# Patient Record
Sex: MALE | Race: WHITE | Employment: UNEMPLOYED | URBAN - NONMETROPOLITAN AREA
[De-identification: names, ages, dates, MRNs, and addresses within clinical notes are randomized per-mention and may not be internally consistent; named-entity substitution may affect disease eponyms.]

---

## 2017-08-02 ENCOUNTER — HOSPITAL ENCOUNTER (INPATIENT)
Age: 42
LOS: 2 days | Discharge: HOME | DRG: 885 | End: 2017-08-04
Attending: EMERGENCY MEDICINE | Admitting: PSYCHIATRY & NEUROLOGY
Payer: MEDICAID

## 2017-08-02 ENCOUNTER — APPOINTMENT (OUTPATIENT)
Dept: CT IMAGING | Age: 42
DRG: 885 | End: 2017-08-02
Payer: MEDICAID

## 2017-08-02 DIAGNOSIS — S09.90XS CLOSED HEAD INJURY, SEQUELA: ICD-10-CM

## 2017-08-02 DIAGNOSIS — F39 MOOD DISORDER (HCC): ICD-10-CM

## 2017-08-02 DIAGNOSIS — R45.851 SUICIDAL IDEATION: Primary | ICD-10-CM

## 2017-08-02 DIAGNOSIS — S01.112S LACERATION OF LEFT EYEBROW, SEQUELA: ICD-10-CM

## 2017-08-02 PROBLEM — F33.2 SEVERE EPISODE OF RECURRENT MAJOR DEPRESSIVE DISORDER, WITHOUT PSYCHOTIC FEATURES (HCC): Status: ACTIVE | Noted: 2017-08-02

## 2017-08-02 LAB
ACETAMINOPHEN LEVEL: <15 UG/ML
ALBUMIN SERPL-MCNC: 4.4 G/DL (ref 3.5–5.2)
ALP BLD-CCNC: 83 U/L (ref 40–130)
ALT SERPL-CCNC: 13 U/L (ref 5–41)
AMPHETAMINE SCREEN, URINE: NEGATIVE
ANION GAP SERPL CALCULATED.3IONS-SCNC: 14 MMOL/L (ref 7–19)
AST SERPL-CCNC: 17 U/L (ref 5–40)
BARBITURATE SCREEN URINE: NEGATIVE
BASOPHILS ABSOLUTE: 0.1 K/UL (ref 0–0.2)
BASOPHILS RELATIVE PERCENT: 0.5 % (ref 0–1)
BENZODIAZEPINE SCREEN, URINE: NEGATIVE
BILIRUB SERPL-MCNC: <0.2 MG/DL (ref 0.2–1.2)
BUN BLDV-MCNC: 20 MG/DL (ref 6–20)
CALCIUM SERPL-MCNC: 10.3 MG/DL (ref 8.6–10)
CANNABINOID SCREEN URINE: NEGATIVE
CHLORIDE BLD-SCNC: 100 MMOL/L (ref 98–111)
CO2: 28 MMOL/L (ref 22–29)
COCAINE METABOLITE SCREEN URINE: NEGATIVE
CREAT SERPL-MCNC: 0.9 MG/DL (ref 0.5–1.2)
EOSINOPHILS ABSOLUTE: 0.1 K/UL (ref 0–0.6)
EOSINOPHILS RELATIVE PERCENT: 0.5 % (ref 0–5)
ETHANOL: <10 MG/DL (ref 0–0.08)
GFR NON-AFRICAN AMERICAN: >60
GLUCOSE BLD-MCNC: 93 MG/DL (ref 74–109)
HCT VFR BLD CALC: 41.6 % (ref 42–52)
HEMOGLOBIN: 13.5 G/DL (ref 14–18)
LYMPHOCYTES ABSOLUTE: 4 K/UL (ref 1.1–4.5)
LYMPHOCYTES RELATIVE PERCENT: 30.4 % (ref 20–40)
Lab: NORMAL
MCH RBC QN AUTO: 26.8 PG (ref 27–31)
MCHC RBC AUTO-ENTMCNC: 32.5 G/DL (ref 33–37)
MCV RBC AUTO: 82.5 FL (ref 80–94)
MONOCYTES ABSOLUTE: 0.8 K/UL (ref 0–0.9)
MONOCYTES RELATIVE PERCENT: 6.2 % (ref 0–10)
NEUTROPHILS ABSOLUTE: 8.3 K/UL (ref 1.5–7.5)
NEUTROPHILS RELATIVE PERCENT: 62.1 % (ref 50–65)
OPIATE SCREEN URINE: NEGATIVE
PDW BLD-RTO: 13.2 % (ref 11.5–14.5)
PLATELET # BLD: 328 K/UL (ref 130–400)
PMV BLD AUTO: 9.4 FL (ref 9.4–12.4)
POTASSIUM SERPL-SCNC: 3.9 MMOL/L (ref 3.5–5)
RBC # BLD: 5.04 M/UL (ref 4.7–6.1)
SALICYLATE, SERUM: <3 MG/DL (ref 3–10)
SODIUM BLD-SCNC: 142 MMOL/L (ref 136–145)
TOTAL PROTEIN: 7.7 G/DL (ref 6.6–8.7)
WBC # BLD: 13.3 K/UL (ref 4.8–10.8)

## 2017-08-02 PROCEDURE — 6370000000 HC RX 637 (ALT 250 FOR IP): Performed by: EMERGENCY MEDICINE

## 2017-08-02 PROCEDURE — 6370000000 HC RX 637 (ALT 250 FOR IP): Performed by: NURSE PRACTITIONER

## 2017-08-02 PROCEDURE — G0480 DRUG TEST DEF 1-7 CLASSES: HCPCS

## 2017-08-02 PROCEDURE — 80053 COMPREHEN METABOLIC PANEL: CPT

## 2017-08-02 PROCEDURE — 36415 COLL VENOUS BLD VENIPUNCTURE: CPT

## 2017-08-02 PROCEDURE — 99284 EMERGENCY DEPT VISIT MOD MDM: CPT | Performed by: EMERGENCY MEDICINE

## 2017-08-02 PROCEDURE — 80307 DRUG TEST PRSMV CHEM ANLYZR: CPT

## 2017-08-02 PROCEDURE — 1240000000 HC EMOTIONAL WELLNESS R&B

## 2017-08-02 PROCEDURE — 90792 PSYCH DIAG EVAL W/MED SRVCS: CPT | Performed by: NURSE PRACTITIONER

## 2017-08-02 PROCEDURE — 99285 EMERGENCY DEPT VISIT HI MDM: CPT

## 2017-08-02 PROCEDURE — 85025 COMPLETE CBC W/AUTO DIFF WBC: CPT

## 2017-08-02 PROCEDURE — 70450 CT HEAD/BRAIN W/O DYE: CPT

## 2017-08-02 RX ORDER — QUETIAPINE FUMARATE 100 MG/1
100 TABLET, FILM COATED ORAL 3 TIMES DAILY
Status: DISCONTINUED | OUTPATIENT
Start: 2017-08-02 | End: 2017-08-04 | Stop reason: HOSPADM

## 2017-08-02 RX ORDER — ACETAMINOPHEN 500 MG
1000 TABLET ORAL ONCE
Status: COMPLETED | OUTPATIENT
Start: 2017-08-02 | End: 2017-08-02

## 2017-08-02 RX ORDER — ACETAMINOPHEN 325 MG/1
650 TABLET ORAL EVERY 4 HOURS PRN
Status: DISCONTINUED | OUTPATIENT
Start: 2017-08-02 | End: 2017-08-04 | Stop reason: HOSPADM

## 2017-08-02 RX ORDER — ESCITALOPRAM OXALATE 10 MG/1
10 TABLET ORAL NIGHTLY
Status: DISCONTINUED | OUTPATIENT
Start: 2017-08-02 | End: 2017-08-04 | Stop reason: HOSPADM

## 2017-08-02 RX ORDER — QUETIAPINE FUMARATE 100 MG/1
100 TABLET, FILM COATED ORAL 3 TIMES DAILY
Status: ON HOLD | COMMUNITY
End: 2017-08-04 | Stop reason: HOSPADM

## 2017-08-02 RX ADMIN — QUETIAPINE FUMARATE 100 MG: 100 TABLET, FILM COATED ORAL at 09:19

## 2017-08-02 RX ADMIN — ESCITALOPRAM OXALATE 10 MG: 10 TABLET ORAL at 21:26

## 2017-08-02 RX ADMIN — QUETIAPINE FUMARATE 100 MG: 100 TABLET, FILM COATED ORAL at 03:23

## 2017-08-02 RX ADMIN — QUETIAPINE FUMARATE 100 MG: 100 TABLET, FILM COATED ORAL at 14:28

## 2017-08-02 RX ADMIN — QUETIAPINE FUMARATE 100 MG: 100 TABLET, FILM COATED ORAL at 21:26

## 2017-08-02 ASSESSMENT — SLEEP AND FATIGUE QUESTIONNAIRES
DO YOU USE A SLEEP AID: YES
DIFFICULTY FALLING ASLEEP: YES
AVERAGE NUMBER OF SLEEP HOURS: 0
RESTFUL SLEEP: NO
DO YOU HAVE DIFFICULTY SLEEPING: YES
DIFFICULTY ARISING: NO
SLEEP PATTERN: DIFFICULTY FALLING ASLEEP;INSOMNIA
DIFFICULTY STAYING ASLEEP: YES

## 2017-08-02 ASSESSMENT — ENCOUNTER SYMPTOMS
APNEA: 0
CONSTIPATION: 0
EYE DISCHARGE: 0
FACIAL SWELLING: 0
SINUS PRESSURE: 0
SORE THROAT: 0
NAUSEA: 0
BLOOD IN STOOL: 0
VOICE CHANGE: 0
CHOKING: 0
DIARRHEA: 0

## 2017-08-02 ASSESSMENT — PAIN SCALES - GENERAL
PAINLEVEL_OUTOF10: 5
PAINLEVEL_OUTOF10: 0

## 2017-08-02 ASSESSMENT — LIFESTYLE VARIABLES: HISTORY_ALCOHOL_USE: YES

## 2017-08-03 LAB
TSH SERPL DL<=0.05 MIU/L-ACNC: 0.75 UIU/ML (ref 0.27–4.2)
VITAMIN B-12: 303 PG/ML (ref 211–946)
VITAMIN D 25-HYDROXY: 20.8 NG/ML

## 2017-08-03 PROCEDURE — 6370000000 HC RX 637 (ALT 250 FOR IP): Performed by: NURSE PRACTITIONER

## 2017-08-03 PROCEDURE — 99231 SBSQ HOSP IP/OBS SF/LOW 25: CPT | Performed by: NURSE PRACTITIONER

## 2017-08-03 PROCEDURE — 6370000000 HC RX 637 (ALT 250 FOR IP): Performed by: EMERGENCY MEDICINE

## 2017-08-03 PROCEDURE — 82607 VITAMIN B-12: CPT

## 2017-08-03 PROCEDURE — 1240000000 HC EMOTIONAL WELLNESS R&B

## 2017-08-03 PROCEDURE — 6370000000 HC RX 637 (ALT 250 FOR IP): Performed by: FAMILY MEDICINE

## 2017-08-03 PROCEDURE — 82306 VITAMIN D 25 HYDROXY: CPT

## 2017-08-03 PROCEDURE — 36415 COLL VENOUS BLD VENIPUNCTURE: CPT

## 2017-08-03 PROCEDURE — 84443 ASSAY THYROID STIM HORMONE: CPT

## 2017-08-03 RX ORDER — ERGOCALCIFEROL 1.25 MG/1
50000 CAPSULE ORAL WEEKLY
Status: DISCONTINUED | OUTPATIENT
Start: 2017-08-03 | End: 2017-08-04 | Stop reason: HOSPADM

## 2017-08-03 RX ORDER — ERGOCALCIFEROL (VITAMIN D2) 1250 MCG
50000 CAPSULE ORAL WEEKLY
Qty: 11 CAPSULE | Refills: 0 | Status: SHIPPED | OUTPATIENT
Start: 2017-08-03 | End: 2017-08-04

## 2017-08-03 RX ORDER — CHOLECALCIFEROL (VITAMIN D3) 125 MCG
500 CAPSULE ORAL DAILY
Status: DISCONTINUED | OUTPATIENT
Start: 2017-08-04 | End: 2017-08-04 | Stop reason: HOSPADM

## 2017-08-03 RX ADMIN — ERGOCALCIFEROL 50000 UNITS: 1.25 CAPSULE ORAL at 21:53

## 2017-08-03 RX ADMIN — QUETIAPINE FUMARATE 100 MG: 100 TABLET, FILM COATED ORAL at 14:20

## 2017-08-03 RX ADMIN — QUETIAPINE FUMARATE 100 MG: 100 TABLET, FILM COATED ORAL at 08:55

## 2017-08-03 RX ADMIN — QUETIAPINE FUMARATE 100 MG: 100 TABLET, FILM COATED ORAL at 21:32

## 2017-08-03 RX ADMIN — ESCITALOPRAM OXALATE 10 MG: 10 TABLET ORAL at 21:32

## 2017-08-04 VITALS
BODY MASS INDEX: 18.43 KG/M2 | SYSTOLIC BLOOD PRESSURE: 101 MMHG | HEIGHT: 63 IN | TEMPERATURE: 98 F | RESPIRATION RATE: 20 BRPM | OXYGEN SATURATION: 98 % | DIASTOLIC BLOOD PRESSURE: 58 MMHG | HEART RATE: 66 BPM | WEIGHT: 104 LBS

## 2017-08-04 PROCEDURE — 6370000000 HC RX 637 (ALT 250 FOR IP): Performed by: FAMILY MEDICINE

## 2017-08-04 PROCEDURE — 6370000000 HC RX 637 (ALT 250 FOR IP): Performed by: EMERGENCY MEDICINE

## 2017-08-04 PROCEDURE — 99238 HOSP IP/OBS DSCHRG MGMT 30/<: CPT | Performed by: NURSE PRACTITIONER

## 2017-08-04 PROCEDURE — 5130000000 HC BRIDGE APPOINTMENT

## 2017-08-04 RX ORDER — QUETIAPINE FUMARATE 100 MG/1
100 TABLET, FILM COATED ORAL 3 TIMES DAILY
Qty: 90 TABLET | Refills: 0 | Status: ON HOLD | OUTPATIENT
Start: 2017-08-04 | End: 2019-06-14 | Stop reason: HOSPADM

## 2017-08-04 RX ORDER — ERGOCALCIFEROL (VITAMIN D2) 1250 MCG
50000 CAPSULE ORAL WEEKLY
Qty: 12 CAPSULE | Refills: 0 | Status: SHIPPED | OUTPATIENT
Start: 2017-08-04 | End: 2019-06-10

## 2017-08-04 RX ORDER — ESCITALOPRAM OXALATE 10 MG/1
10 TABLET ORAL NIGHTLY
Qty: 30 TABLET | Refills: 0 | Status: ON HOLD | OUTPATIENT
Start: 2017-08-04 | End: 2019-06-14 | Stop reason: HOSPADM

## 2017-08-04 RX ADMIN — QUETIAPINE FUMARATE 100 MG: 100 TABLET, FILM COATED ORAL at 09:13

## 2017-08-04 RX ADMIN — CYANOCOBALAMIN TAB 500 MCG 500 MCG: 500 TAB at 09:13

## 2017-08-04 RX ADMIN — QUETIAPINE FUMARATE 100 MG: 100 TABLET, FILM COATED ORAL at 14:07

## 2019-06-10 ENCOUNTER — HOSPITAL ENCOUNTER (INPATIENT)
Age: 44
LOS: 4 days | Discharge: HOME | DRG: 885 | End: 2019-06-14
Attending: EMERGENCY MEDICINE | Admitting: PSYCHIATRY & NEUROLOGY
Payer: MEDICAID

## 2019-06-10 DIAGNOSIS — F32.A DEPRESSION WITH SUICIDAL IDEATION: Primary | ICD-10-CM

## 2019-06-10 DIAGNOSIS — R45.851 DEPRESSION WITH SUICIDAL IDEATION: Primary | ICD-10-CM

## 2019-06-10 LAB
ALBUMIN SERPL-MCNC: 4.1 G/DL (ref 3.5–5.2)
ALP BLD-CCNC: 62 U/L (ref 40–130)
ALT SERPL-CCNC: 22 U/L (ref 5–41)
AMPHETAMINE SCREEN, URINE: NEGATIVE
ANION GAP SERPL CALCULATED.3IONS-SCNC: 9 MMOL/L (ref 7–19)
AST SERPL-CCNC: 17 U/L (ref 5–40)
BARBITURATE SCREEN URINE: NEGATIVE
BASOPHILS ABSOLUTE: 0.1 K/UL (ref 0–0.2)
BASOPHILS RELATIVE PERCENT: 0.8 % (ref 0–1)
BENZODIAZEPINE SCREEN, URINE: NEGATIVE
BILIRUB SERPL-MCNC: <0.2 MG/DL (ref 0.2–1.2)
BUN BLDV-MCNC: 25 MG/DL (ref 6–20)
CALCIUM SERPL-MCNC: 9.2 MG/DL (ref 8.6–10)
CANNABINOID SCREEN URINE: NEGATIVE
CHLORIDE BLD-SCNC: 104 MMOL/L (ref 98–111)
CO2: 27 MMOL/L (ref 22–29)
COCAINE METABOLITE SCREEN URINE: NEGATIVE
CREAT SERPL-MCNC: 0.9 MG/DL (ref 0.5–1.2)
EOSINOPHILS ABSOLUTE: 0.2 K/UL (ref 0–0.6)
EOSINOPHILS RELATIVE PERCENT: 1.8 % (ref 0–5)
ETHANOL: <10 MG/DL (ref 0–0.08)
GFR NON-AFRICAN AMERICAN: >60
GLUCOSE BLD-MCNC: 100 MG/DL (ref 74–109)
HCT VFR BLD CALC: 41.6 % (ref 42–52)
HEMOGLOBIN: 13.3 G/DL (ref 14–18)
LYMPHOCYTES ABSOLUTE: 2.6 K/UL (ref 1.1–4.5)
LYMPHOCYTES RELATIVE PERCENT: 27.6 % (ref 20–40)
Lab: NORMAL
MCH RBC QN AUTO: 26.3 PG (ref 27–31)
MCHC RBC AUTO-ENTMCNC: 32 G/DL (ref 33–37)
MCV RBC AUTO: 82.2 FL (ref 80–94)
MONOCYTES ABSOLUTE: 0.6 K/UL (ref 0–0.9)
MONOCYTES RELATIVE PERCENT: 6.6 % (ref 0–10)
NEUTROPHILS ABSOLUTE: 5.9 K/UL (ref 1.5–7.5)
NEUTROPHILS RELATIVE PERCENT: 62.9 % (ref 50–65)
OPIATE SCREEN URINE: NEGATIVE
PDW BLD-RTO: 14.4 % (ref 11.5–14.5)
PLATELET # BLD: 265 K/UL (ref 130–400)
PMV BLD AUTO: 9.9 FL (ref 9.4–12.4)
POTASSIUM SERPL-SCNC: 4.1 MMOL/L (ref 3.5–5)
RBC # BLD: 5.06 M/UL (ref 4.7–6.1)
SODIUM BLD-SCNC: 140 MMOL/L (ref 136–145)
TOTAL PROTEIN: 6.5 G/DL (ref 6.6–8.7)
WBC # BLD: 9.3 K/UL (ref 4.8–10.8)

## 2019-06-10 PROCEDURE — 6370000000 HC RX 637 (ALT 250 FOR IP): Performed by: EMERGENCY MEDICINE

## 2019-06-10 PROCEDURE — 99285 EMERGENCY DEPT VISIT HI MDM: CPT

## 2019-06-10 PROCEDURE — 85025 COMPLETE CBC W/AUTO DIFF WBC: CPT

## 2019-06-10 PROCEDURE — 80053 COMPREHEN METABOLIC PANEL: CPT

## 2019-06-10 PROCEDURE — 80307 DRUG TEST PRSMV CHEM ANLYZR: CPT

## 2019-06-10 PROCEDURE — 1240000000 HC EMOTIONAL WELLNESS R&B

## 2019-06-10 PROCEDURE — 36415 COLL VENOUS BLD VENIPUNCTURE: CPT

## 2019-06-10 PROCEDURE — 99285 EMERGENCY DEPT VISIT HI MDM: CPT | Performed by: EMERGENCY MEDICINE

## 2019-06-10 PROCEDURE — G0480 DRUG TEST DEF 1-7 CLASSES: HCPCS

## 2019-06-10 RX ORDER — QUETIAPINE FUMARATE 50 MG/1
100 TABLET, FILM COATED ORAL ONCE
Status: COMPLETED | OUTPATIENT
Start: 2019-06-10 | End: 2019-06-10

## 2019-06-10 RX ORDER — NICOTINE 21 MG/24HR
1 PATCH, TRANSDERMAL 24 HOURS TRANSDERMAL DAILY
Status: DISCONTINUED | OUTPATIENT
Start: 2019-06-10 | End: 2019-06-14 | Stop reason: HOSPADM

## 2019-06-10 RX ORDER — ACETAMINOPHEN 325 MG/1
650 TABLET ORAL EVERY 4 HOURS PRN
Status: DISCONTINUED | OUTPATIENT
Start: 2019-06-10 | End: 2019-06-14 | Stop reason: HOSPADM

## 2019-06-10 RX ADMIN — QUETIAPINE FUMARATE 100 MG: 50 TABLET ORAL at 05:07

## 2019-06-10 ASSESSMENT — SLEEP AND FATIGUE QUESTIONNAIRES
AVERAGE NUMBER OF SLEEP HOURS: 8
DO YOU HAVE DIFFICULTY SLEEPING: NO
DO YOU USE A SLEEP AID: NO

## 2019-06-10 ASSESSMENT — LIFESTYLE VARIABLES: HISTORY_ALCOHOL_USE: NO

## 2019-06-10 ASSESSMENT — PATIENT HEALTH QUESTIONNAIRE - PHQ9: SUM OF ALL RESPONSES TO PHQ QUESTIONS 1-9: 11
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
## 2019-06-10 NOTE — PROGRESS NOTES
Treatment Team Note:    LISSETTE met with [address] [address] team to discuss Pts [address] plans. Progress/Behavior/Group Attendance: TBD    Target Symptoms/Reason for admission:     Diagnoses:     UDS: Neg-     BAL: Neg    AftercarePlan: [address]    Pt lives with: SW will meet with pt to gather information. Collateral obtained from: SW will meet with pt to gather release of information.   On:    Family Session: TBA    Misc:

## 2019-06-10 NOTE — ED NOTES
Bed: 18  Expected date:   Expected time:   Means of arrival:   Comments:  Dwait Ayala RN  06/10/19 0186

## 2019-06-10 NOTE — PROGRESS NOTES
CSW completed psychosocial and CSSR-S on this date. Pt long and short term goals discussed. Pt voiced understanding. Treatment plan sheet signed. Pt verbalized understanding of the treatment plan. Pt participated in goals and objectives of the treatment plan. It was identified that pt will require outpatient follow up appointments at local community behavioral health facility such as[address]. Pt validated need for appointments. In the last 6 months has the pt been danger to self: YES  In the last 6 months has the pt been danger to others:  NO     Provided pt with [employer] handout entitled \"Quitting Smoking,\" reviewed handout with pt addressing dangers of smoking, developing coping skills, and providing basic information about quitting.     Patient received all components / Patient refused/declined practical counseling of tobacco practical counseling during the hospital stay

## 2019-06-10 NOTE — PLAN OF CARE
Problem: Altered Mood, Depressive Behavior:  Goal: Able to verbalize and/or display a decrease in depressive symptoms  Outcome: Ongoing                                                                       Group Therapy Note    Date: 6/10/2019  Start Time: 1430  End Time:  3824  Number of Participants: 13    Type of Group: Cognitive Skills    Wellness Binder Information  Module Name:  emotional wellness  Session Number:  1    Patient's Goal:  validation of feelings    Notes:  pt acknowledged to have feelings validated it may be necessary to share feelings with others.     Status After Intervention:  Improved    Participation Level: Interactive    Participation Quality: Appropriate, Attentive and Sharing      Speech:  normal      Thought Process/Content: Logical      Affective Functioning: Congruent      Mood: congruent      Level of consciousness:  Alert, Oriented x4 and Attentive      Response to Learning: Able to verbalize current knowledge/experience      Endings: None Reported    Modes of Intervention: Education      Discipline Responsible: Psychoeducational Specialist      Signature:  Leidy Mead

## 2019-06-10 NOTE — ED TRIAGE NOTES
Pt placed in purple scrubs. Vitals obtained. Sitter initiated. Pt states \" I want to kill myself. I will use a bed sheet like I did in 1990. \"

## 2019-06-10 NOTE — PROGRESS NOTES
BHI Admission From ED  Nursing Admission Note    Admission Type: Voluntary         Patient Active Problem List   Diagnosis    Suicidal ideation    Severe episode of recurrent major depressive disorder, without psychotic features ([facility] [address]       Pt admitted from Dr. Lidya Benjamin care in ED to [facility] room # 605-1. Arrived on unit via [facility] with secuirty escort. Pt appropriately attired in paper scrubs. Body assessment completed by George Wade RN and [address], [facility] with no contraband discovered. All tubes, lines, and drains were appropriately discontinued by ED staff prior to pt transfer to [facility]. Pt belongings and valuables inventoried and cataloged, stored per policy. Pt oriented to surroundings, program expectations, and copy pt rights given. Received admit packet: [facility], Visitation Info, Fall Prevention, Restraints Info. Consents reviewed, signed Pt Rights, Handbook Acceptance, Visit/Call Acceptance, PHI Release, Social Info Release, and Treatment Agreement. Pt verbalizes understanding. Pt is a smoker?  yes Pt offered Nicotine patch yes  Pt refused Nicotine patch? no         Status and Exam  Normal: No  Facial Expression: Sad, Worried  Affect: Appropriate  Level of Consciousness: Alert  Mood:Normal: No  Mood: Depressed, Anxious  Motor Activity:Normal: No  Motor Activity: Decreased  Interview Behavior: Cooperative  Preception: Orondo to Person, Diantha Meghan to Time, Or[place] to Place, Orondo to Situation  Attention:Normal: No  Attention: Unable to Concentrate  Thought Processes: Circumstantial  Thought Content:Normal: No  Thought Content: Preoccupations  Hallucinations: None  Delusions: No  Memory:Normal: Yes  Insight and Judgment: No  Insight and Judgment: Poor Insight, Poor Judgment  Present Suicidal Ideation: No  Present Homicidal Ideation: No

## 2019-06-10 NOTE — ED NOTES
Pt changed into paper scrubs, belongings given to security. Blood and urine sent to lab.       Flex Figueroa  06/10/19 4385

## 2019-06-10 NOTE — PLAN OF CARE
Group Therapy Note    Date: 6/10/2019  Start Time: 1100  End Time:  9480  Number of Participants: 11    Type of Group: Psychoeducation    Wellness Binder Information  Module Name:  emotional wellness  Session Number:  5    Patient's Goal:  obstacles to emotional wellness    Notes:  pt was verbally prompted to attend group. Pt refused. Information about obstacles to wellness was provided    Status After Intervention:      Participation Level:     Participation Quality:       Speech:         Thought Process/Content:       Affective Functioning:       Mood:       Level of consciousness:        Response to Learning:       Endings:     Modes of Intervention:       Discipline Responsible: Psychoeducational Specialist      Signature:  Sulma Gomez

## 2019-06-10 NOTE — ED NOTES
Bed: 20  Expected date:   Expected time:   Means of arrival:   Comments:     Mitch Olson, LUIS DANIEL  06/10/19 0016

## 2019-06-10 NOTE — ED NOTES
Bed: 21  Expected date:   Expected time:   Means of arrival:   Comments:     Franck Ramirez, RN  06/10/19 8543

## 2019-06-10 NOTE — PLAN OF CARE
Problem: Altered Mood, Depressive Behavior:  Goal: Able to verbalize and/or display a decrease in depressive symptoms  6/10/2019 1601 by Angelia Cole  Outcome: Ongoing                                                                       Group Therapy Note    Date: 6/10/2019  Start Time: 9648  End Time:  1600  Number of Participants: 13    Type of Group: Recovery    Wellness Binder Information  Module Name:  social wellness  Session Number:  1    Patient's Goal:  your support network    Notes:  pt acknowledged Doctor, Elham Daniel as an example of a support network.     Status After Intervention:  Improved    Participation Level: Interactive    Participation Quality: Appropriate, Attentive and Sharing      Speech:  normal      Thought Process/Content: Logical      Affective Functioning: Congruent      Mood: congruent      Level of consciousness:  Alert, Oriented x4 and Attentive      Response to Learning: Able to verbalize current knowledge/experience      Endings: None Reported    Modes of Intervention: Education      Discipline Responsible: Psychoeducational Specialist      Signature:  Angelia Cole

## 2019-06-10 NOTE — ED PROVIDER NOTES
Spouse name: None    Number of children: None    Years of education: None    Highest education level: None   Occupational History    None   Social Needs    Financial resource strain: None    Food insecurity:     Worry: None     Inability: None    Transportation needs:     Medical: None     Non-medical: None   Tobacco Use    Smoking status: Current Every Day Smoker    Smokeless tobacco: Never Used   Substance and Sexual Activity    Alcohol use: No    Drug use: No    Sexual activity: None   Lifestyle    Physical activity:     Days per week: None     Minutes per session: None    Stress: None   Relationships    Social connections:     Talks on phone: None     Gets together: None     Attends Yazidi service: None     Active member of club or organization: None     Attends meetings of clubs or organizations: None     Relationship status: None    Intimate partner violence:     Fear of current or ex partner: None     Emotionally abused: None     Physically abused: None     Forced sexual activity: None   Other Topics Concern    None   Social History Narrative    None       SCREENINGS             PHYSICAL EXAM    (up to 7 for level 4, 8 or more for level 5)     ED Triage Vitals [06/10/19 0339]   BP Temp Temp Source Pulse Resp SpO2 Height Weight   99/75 97.9 °F (36.6 °C) Oral 73 20 96 % 5' 3\" (1.6 m) 105 lb (47.6 kg)       Physical Exam   Constitutional: He is oriented to person, place, and time. No distress. HENT:   Head: Atraumatic. Mouth/Throat: Mucous membranes are not dry. Cardiovascular: Normal rate, regular rhythm and normal heart sounds. Pulmonary/Chest: Effort normal and breath sounds normal. No respiratory distress. Abdominal: Soft. There is no tenderness. Neurological: He is alert and oriented to person, place, and time. Nursing note and vitals reviewed.       DIAGNOSTIC RESULTS         LABS:  Labs Reviewed   COMPREHENSIVE METABOLIC PANEL - Abnormal; Notable for the following components:       Result Value    BUN 25 (*)     Total Protein 6.5 (*)     All other components within normal limits   CBC WITH AUTO DIFFERENTIAL - Abnormal; Notable for the following components:    Hemoglobin 13.3 (*)     Hematocrit 41.6 (*)     MCH 26.3 (*)     MCHC 32.0 (*)     All other components within normal limits   ETHANOL   URINE DRUG SCREEN       All other labs were within normal range or not returned as of this dictation. EMERGENCY DEPARTMENT COURSE and DIFFERENTIAL DIAGNOSIS/MDM:   Vitals:    Vitals:    06/10/19 0339   BP: 99/75   Pulse: 73   Resp: 20   Temp: 97.9 °F (36.6 °C)   TempSrc: Oral   SpO2: 96%   Weight: 105 lb (47.6 kg)   Height: 5' 3\" (1.6 m)       MDM        CONSULTS:    Patient was seen and evaluated by mental professional and after discussing with on-call psychiatry, Dr. Bernarda Leventhal, patient was accepted for inpatient admission to the adult [facility]. There are currently no inpatient beds available due to inpatient census cap. Patient will be held in the emergency department pending inpatient bed availability. He is agreeable with the plan of care. This is a voluntary admission. PROCEDURES:  Unless otherwise noted below, none     Procedures    FINAL IMPRESSION      1.  Depression with suicidal ideation          DISPOSITION/PLAN   DISPOSITION  Admitted to [facility]      (Please note that portions of this note were completed with a voice recognition program.  Efforts were made to edit thedictations but occasionally words are mis-transcribed.)    Silva Reid MD (electronically signed)  Attending Emergency Physician         Silva Reid MD  07/19/19 7748

## 2019-06-10 NOTE — PLAN OF CARE
Problem: Altered Mood, Depressive Behavior:  Goal: Participates in care planning  Description  Participates in care planning  Outcome: Ongoing  Note:   Pt long and short term goals discussed. Pt voiced understanding. Treatment sheet signed.

## 2019-06-11 LAB
ANION GAP SERPL CALCULATED.3IONS-SCNC: 11 MMOL/L (ref 7–19)
BUN BLDV-MCNC: 23 MG/DL (ref 6–20)
CALCIUM SERPL-MCNC: 8.9 MG/DL (ref 8.6–10)
CHLORIDE BLD-SCNC: 105 MMOL/L (ref 98–111)
CO2: 26 MMOL/L (ref 22–29)
CREAT SERPL-MCNC: 0.7 MG/DL (ref 0.5–1.2)
GFR NON-AFRICAN AMERICAN: >60
GLUCOSE BLD-MCNC: 96 MG/DL (ref 74–109)
POTASSIUM SERPL-SCNC: 4.2 MMOL/L (ref 3.5–5)
SODIUM BLD-SCNC: 142 MMOL/L (ref 136–145)
TSH REFLEX FT4: 0.83 UIU/ML (ref 0.35–5.5)
VITAMIN B-12: 446 PG/ML (ref 211–946)
VITAMIN D 25-HYDROXY: 30.1 NG/ML

## 2019-06-11 PROCEDURE — 1240000000 HC EMOTIONAL WELLNESS R&B

## 2019-06-11 PROCEDURE — 6370000000 HC RX 637 (ALT 250 FOR IP): Performed by: NURSE PRACTITIONER

## 2019-06-11 PROCEDURE — 36415 COLL VENOUS BLD VENIPUNCTURE: CPT

## 2019-06-11 PROCEDURE — 82607 VITAMIN B-12: CPT

## 2019-06-11 PROCEDURE — 90792 PSYCH DIAG EVAL W/MED SRVCS: CPT | Performed by: NURSE PRACTITIONER

## 2019-06-11 PROCEDURE — 80048 BASIC METABOLIC PNL TOTAL CA: CPT

## 2019-06-11 PROCEDURE — 82306 VITAMIN D 25 HYDROXY: CPT

## 2019-06-11 PROCEDURE — 84443 ASSAY THYROID STIM HORMONE: CPT

## 2019-06-11 RX ORDER — QUETIAPINE FUMARATE 200 MG/1
200 TABLET, FILM COATED ORAL NIGHTLY
Status: DISCONTINUED | OUTPATIENT
Start: 2019-06-11 | End: 2019-06-14 | Stop reason: HOSPADM

## 2019-06-11 RX ORDER — ESCITALOPRAM OXALATE 10 MG/1
10 TABLET ORAL DAILY
Status: DISCONTINUED | OUTPATIENT
Start: 2019-06-11 | End: 2019-06-14 | Stop reason: HOSPADM

## 2019-06-11 RX ADMIN — QUETIAPINE FUMARATE 200 MG: 200 TABLET ORAL at 21:09

## 2019-06-11 RX ADMIN — ESCITALOPRAM OXALATE 10 MG: 10 TABLET, FILM COATED ORAL at 15:08
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
## 2019-06-11 NOTE — PLAN OF CARE
Problem: Altered Mood, Depressive Behavior:  Goal: Able to verbalize acceptance of life and situations over which he or she has no control  Description  Able to verbalize acceptance of life and situations over which he or she has no control  Outcome: Ongoing  Goal: Able to verbalize and/or display a decrease in depressive symptoms  Description  Able to verbalize and/or display a decrease in depressive symptoms  Outcome: Ongoing  Goal: Ability to disclose and discuss suicidal ideas will improve  Description  Ability to disclose and discuss suicidal ideas will improve  Outcome: Ongoing  Goal: Able to verbalize support systems  Description  Able to verbalize support systems  Outcome: Ongoing  Goal: Absence of self-harm  Description  Absence of self-harm  Outcome: Ongoing  Goal: Patient specific goal  Description  Patient specific goal  Outcome: Ongoing  Goal: Participates in care planning  Description  Participates in care planning  Outcome: Ongoing     Problem: Suicide risk  Description  Suicide risk  Goal: Provide patient with safe environment  Description  Provide patient with safe environment  Outcome: Ongoing

## 2019-06-11 NOTE — PROGRESS NOTES
Treatment Team Note:     LISSETTE met with [facility] team to discuss Pts TX and DC plans.      Progress/Behavior/Group Attendance: TBD     Target Symptoms/Reason for admission:      Diagnoses:      UDS: Neg-      BAL: Neg     AftercarePlan: [address] lives with: SW will meet with pt to gather information.     Collateral obtained from: SW will meet with pt to gather release of information.   On:     Family Session: TBA     Misc:

## 2019-06-11 NOTE — H&P
Problem List:  Depression, SI--follow with Psych  Hypotension---push PO fluids, recheck labs in am  Anemia        Lisa Fortune MD  12:42 AM 6/11/2019

## 2019-06-11 NOTE — PROGRESS NOTES
[facility] Adult Unit Daily Assessment  Nursing Progress Note    Room: [facility] [place]-01   Name: Marianela Siddiqui   Age: 53 y.o. Gender: male   Dx: <principal problem not specified>  Precautions: suicide risk  Inpatient Status: voluntary       Sleep: Yes,   Sleep Quality Good   Hours Slept: SEE FLOW SHEET   Sleep Medications: No  PRN Sleep Meds: No       Other PRN Meds: No   Med Compliant: N/A   Accu-Chek: No   Oxygen: No  CIWA/CINA: No          SI denies suicidal ideation    HI Negative for homicidal ideation        AVH:DENIES      Depression: SAME   Anxiety: SAME       Appetite: poor    Social: No   Speech: hesitant   Appearance: appropriately dressed, disheveled and no or minimal eye contact    Group Participation: No  Participation LevelNone    Participation QualityLethargic    Notes:   PATIENT LAYING IN BED SLEEPING AT TIME OF INTERVIEW. PATIENT WOKE LONG ENOUGH TO  ANSWER QUESTIONS AND THEN WENT RIGHT BACK TO SLEEP. NO OBVIOUS S/S OF DISTRESS VOICED OR NOTED. WILL CONTINUE TO MONITOR.     Electronically signed by Eliane Chavez RN on 6/10/19 at 10:53 PM

## 2019-06-11 NOTE — PLAN OF CARE
Problem: Altered Mood, Depressive Behavior:  Goal: Able to verbalize acceptance of life and situations over which he or she has no control  Description  Able to verbalize acceptance of life and situations over which he or she has no control  6/11/2019 1201 by Barba Osgood, LPC  Outcome: Ongoing     Group Therapy Note     Date: 6/11/2019  Start Time: 1000  End Time:  1045  Number of Participants: 11     Type of Group: Psychotherapy     Patient's Goal: Patient will process emotions and actions and how to relate to other or their with others. Patient discussed open communication and feelings and emotions. Notes:  Patient attended process group as scheduled, patient identified a issue to work on today regarding how they will interact and relate to others. Status After Intervention:  Improved     Participation Level:  Active Listener     Participation Quality: Appropriate, Attentive and Sharing        Speech:  normal        Thought Process/Content: Logical        Affective Functioning: Congruent        Mood: euthymic        Level of consciousness:  Alert        Response to Learning: Able to verbalize current knowledge/experience        Endings: None Reported     Modes of Intervention: Education, Support and Socialization        Discipline Responsible: /Counselor        Signature:  Barba Osgood, [facility] [place]

## 2019-06-11 NOTE — PLAN OF CARE
Problem: Altered Mood, Depressive Behavior:  Goal: Able to verbalize acceptance of life and situations over which he or she has no control  6/11/2019 1109 by Jude Eugene  Outcome: Ongoing                                                                       Group Therapy Note    Date: 6/11/2019  Start Time: 1000  End Time:  2063  Number of Participants: 14     Type of Group: Psychoeducation    Wellness Binder Information  Module Name:  emotional wellness  Session Number:  5    Patient's Goal:  obstacles to emotional wellness    Notes:  pt acknowledged negative thinking as an obstacle to emotional wellness.     Status After Intervention:  Improved    Participation Level: Interactive    Participation Quality: Appropriate, Attentive and Sharing      Speech:  normal      Thought Process/Content: Logical      Affective Functioning: Congruent      Mood: congruent      Level of consciousness:  Alert, Oriented x4 and Attentive      Response to Learning: Able to verbalize current knowledge/experience      Endings: None Reported    Modes of Intervention: Education      Discipline Responsible: Psychoeducational Specialist      Signature:  Jude Eugene

## 2019-06-12 PROBLEM — F33.3 SEVERE EPISODE OF RECURRENT MAJOR DEPRESSIVE DISORDER, WITH PSYCHOTIC FEATURES (HCC): Status: ACTIVE | Noted: 2019-06-12

## 2019-06-12 PROBLEM — F33.2 SEVERE EPISODE OF RECURRENT MAJOR DEPRESSIVE DISORDER, WITHOUT PSYCHOTIC FEATURES (HCC): Status: RESOLVED | Noted: 2017-08-02 | Resolved: 2019-06-12

## 2019-06-12 LAB
CHOLESTEROL, TOTAL: 219 MG/DL (ref 160–199)
HBA1C MFR BLD: 5.3 % (ref 4–6)
HDLC SERPL-MCNC: 50 MG/DL (ref 55–121)
LDL CHOLESTEROL CALCULATED: 144 MG/DL
TRIGL SERPL-MCNC: 124 MG/DL (ref 0–149)

## 2019-06-12 PROCEDURE — 1240000000 HC EMOTIONAL WELLNESS R&B

## 2019-06-12 PROCEDURE — 36415 COLL VENOUS BLD VENIPUNCTURE: CPT

## 2019-06-12 PROCEDURE — 80061 LIPID PANEL: CPT

## 2019-06-12 PROCEDURE — 99231 SBSQ HOSP IP/OBS SF/LOW 25: CPT | Performed by: NURSE PRACTITIONER

## 2019-06-12 PROCEDURE — 6370000000 HC RX 637 (ALT 250 FOR IP): Performed by: NURSE PRACTITIONER

## 2019-06-12 PROCEDURE — 83036 HEMOGLOBIN GLYCOSYLATED A1C: CPT

## 2019-06-12 RX ADMIN — ESCITALOPRAM OXALATE 10 MG: 10 TABLET, FILM COATED ORAL at 09:02

## 2019-06-12 RX ADMIN — QUETIAPINE FUMARATE 200 MG: 200 TABLET ORAL at 20:15

## 2019-06-12 NOTE — PLAN OF CARE
Problem: Altered Mood, Depressive Behavior:  Goal: Able to verbalize and/or display a decrease in depressive symptoms  6/12/2019 1607 by Kentrell Newsome  Outcome: Ongoing                                                                       Group Therapy Note    Date: 6/12/2019  Start Time: 8282  End Time:  1600  Number of Participants: 8    Type of Group: Recovery    Wellness Binder Information  Module Name:  relapse prevention  Session Number:  2    Patient's Goal:  identifying early warning signs    Notes:  pt acknowledged negative thinking as an early warning sign to help prevent relapse.     Status After Intervention:  Improved    Participation Level: Interactive    Participation Quality: Appropriate, Attentive and Sharing      Speech:  normal      Thought Process/Content: Logical      Affective Functioning: Congruent      Mood: congruent      Level of consciousness:  Alert, Oriented x4 and Attentive      Response to Learning: Able to verbalize current knowledge/experience      Endings: None Reported    Modes of Intervention: Education      Discipline Responsible: Psychoeducational Specialist      Signature:  Kentrell Newsome

## 2019-06-12 NOTE — PROGRESS NOTES
Progress Note  Linh Span  6/12/2019 12:21 AM  Subjective:   Admit Date:   6/10/2019      CC/ADMIT DX:       Interval History:   Reviewed overnight events and nursing notes. No new physical complaints. I have reviewed all labs/diagnostics from the last 24hrs. ROS:   I have done a 10 point ROS and all are negative, except what is mentioned in the HPI. DIET GENERAL;    Medications:      QUEtiapine  200 mg Oral Nightly    escitalopram  10 mg Oral Daily    nicotine  1 patch Transdermal Daily           Objective:   Vitals: BP 98/69   Pulse 68   Temp 98.1 °F (36.7 °C) (Temporal)   Resp 16   Ht 5' (1.524 m)   Wt 105 lb (47.6 kg)   SpO2 97%   BMI 20.51 kg/m²  No intake or output data in the 24 hours ending 06/12/19 0021  General appearance: alert and cooperative with exam  Lungs: clear to auscultation bilaterally  Heart: RRR  Abdomen: soft, non-tender; bowel sounds normal; no masses,  no organomegaly  Extremities: extremities normal, atraumatic, no cyanosis or edema  Neurologic:  No obvious focal neurologic deficits. Assessment and Plan:   Principal Problem:    Severe episode of recurrent major depressive disorder, without psychotic features ([address]  Resolved Problems:    * No resolved hospital problems. *    Hypotension    Plan:  1. Continue present medication(s)   2. Follow with BP. It is improved. 3.  Follow with Psych      Discharge planning:    home     Reviewed treatment plans with the patient and/or family.              Electronically signed by Chaitanya Ortiz MD on 6/12/2019 at 12:21 AM

## 2019-06-12 NOTE — PROGRESS NOTES
[facility] Daily Shift Assessment  Nursing Progress Note    Room: 06/605-01 Name: Oscar Nurse Age: 53 y.o. Gender: male   Dx: Severe episode of recurrent major depressive disorder, with psychotic features ([facility] [address]  Precautions: suicide risk  Target Symptoms:   Accu-Chek: NoSleep: Yes,Sleep Quality Good SI denies AVH denies 5 [facility]  ADLs: No Speech: pressured and hesitant Depression: 5 Anxiety: 5   Participation Level  Visitation:    Participation Quality    Complaints:    Notes: Pt up in day room during interview. Pt calm and cooperative. Pt affect is flat. Pt denies SI, HI, and AVH. Pt rates depression 5 and anxiety 5. Pt reports good sleep and normal appetite. Pt states, \"I'm feeling better. \" Pt is social at times. Pt goes to group. Pt did not do ADLs. Signature:    Sarah White RN
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
## 2019-06-12 NOTE — PROGRESS NOTES
[facility] Adult Unit Daily Assessment  Nursing Progress Note    Room: [facility]605-01   Name: Diana Colunga   Age: 53 y.o. Gender: male   Dx: Severe episode of recurrent major depressive disorder, without psychotic features ([address]  Precautions: suicide risk  Inpatient Status: voluntary       Sleep: No,   Sleep Quality Poor PER PATIENT  Hours Slept: SEE FLOW SHEET   Sleep Medications: Yes  PRN Sleep Meds: No       Other PRN Meds: No   Med Compliant: Yes   Accu-Chek: No   Oxygen: No  CIWA/CINA: No          SI active and without plan    HI Negative for homicidal ideation        AVH:Present - [employer] Hallucinations HEARING VOICES TELLING HIM TO HURT HIMSELF   auditory      Depression: 10   Anxiety: 6       Appetite: good    Social: SITS IN DAY AREA BUT DOES NOT INTERACT WITH OTHERS   Speech: pressured   Appearance: appropriately dressed and looks older    Group Participation: No  Participation LevelNone    Participation Quality    Notes:   PATIENT SITTING IN DAY AREA WITH 1:1 SITTER AT THIS TIME. PATIENT STATES THAT THE VOICES TOLD HIM TO PUT THE SHIRT AROUND HIS NECK THIS MORNING BUT THAT HE WILL NOT DO THAT AGAIN. NO OBVIOUS S/S OF DISTRESS VOICED OR NOTED. WILL CONTINUE TO MONITOR.     Electronically signed by Ilir Marino RN on 6/11/19 at 10:07 PM

## 2019-06-12 NOTE — PLAN OF CARE
Problem: Altered Mood, Depressive Behavior:  Goal: Able to verbalize acceptance of life and situations over which he or she has no control  6/12/2019 1148 by Candido Hernandez  Outcome: Ongoing                                                                       Group Therapy Note    Date: 6/12/2019  Start Time: 1100  End Time:  3344  Number of Participants: 9    Type of Group: Psychoeducation    Wellness Binder Information  Module Name:  staying well  Session Number:  1    Patient's Goal:  daily maintenance and coping skills    Notes:  pt acknowledged use of positive coping skills daily to help stay well.     Status After Intervention:  Improved    Participation Level: Interactive    Participation Quality: Appropriate, Attentive and Sharing      Speech:  normal      Thought Process/Content: Logical      Affective Functioning: Congruent      Mood: congruent      Level of consciousness:  Alert, Oriented x4 and Attentive      Response to Learning: Able to verbalize current knowledge/experience      Endings: None Reported    Modes of Intervention: Education      Discipline Responsible: Psychoeducational Specialist      Signature:  Candido Hernandez

## 2019-06-12 NOTE — GROUP NOTE
Group Therapy Note    Date: June 12    Group Start Time: 1600  Group End Time: 1630  Group Topic: Healthy Living/Wellness    [facility]    Christie Alves RN        Group Therapy Note    Attendees: 11         Patient's Goal:  Better understanding of medication    Notes:  Medication education    Status After Intervention:  Improved    Participation Level: Active Listener and Interactive    Participation Quality: Appropriate and Attentive      Speech:  normal      Thought Process/Content: Logical  Linear      Affective Functioning: Congruent      Mood: anxious and depressed      Level of consciousness:  Alert, Oriented x4 and Attentive      Response to Learning: Progressing to goal      Endings: None Reported    Modes of Intervention: Education      Discipline Responsible: Registered Nurse      Signature:   Christie Alves RN

## 2019-06-12 NOTE — PROGRESS NOTES
Group Therapy Note    Start Time: 337  End Time:  547  Number of Participants: 12    Type of Group: Community Meeting       Patient's Goal:  \"my depression\"      Notes:      Participation Level:  Active Listener       Participation Quality: Appropriate      Thought Process/Content: Logical      Affective Functioning: Congruent      Mood: calm      Level of consciousness:  Alert      Modes of Intervention: Support      Discipline Responsible: Behavioral Health Tech II      Signature:  Leonard Johnson

## 2019-06-12 NOTE — PLAN OF CARE
Problem: Altered Mood, Depressive Behavior:  Goal: Able to verbalize acceptance of life and situations over which he or she has no control  Description  Able to verbalize acceptance of life and situations over which he or she has no control  6/12/2019 1059 by Viola Naidu LPC  Outcome: Ongoing      Group Therapy Note     Date: 6/12/2019  Start Time: 1000  End Time:  1045  Number of Participants: 8     Type of Group: Psychotherapy     Patient's Goal: Patient will process emotions and actions and how to relate to other or their with others. Patient discussed open communication and feelings and emotions. Notes:  Patient attended process group as scheduled, patient identified a issue to work on today regarding how they will interact and relate to others. Status After Intervention:  Improved     Participation Level:  Active Listener     Participation Quality: Appropriate, Attentive and Sharing        Speech:  normal        Thought Process/Content: Logical        Affective Functioning: Congruent        Mood: euthymic        Level of consciousness:  Alert        Response to Learning: Able to verbalize current knowledge/experience        Endings: None Reported     Modes of Intervention: Education, Support and Socialization        Discipline Responsible: /Counselor        Signature:  Viola Naidu [facility] [place]

## 2019-06-12 NOTE — PLAN OF CARE
Problem: Altered Mood, Depressive Behavior:  Goal: Ability to disclose and discuss suicidal ideas will improve  6/12/2019 1551 by Jude Eugene  Outcome: Ongoing                                                                        Group Therapy Note    Date: 6/12/2019  Start Time: 1430  End Time:  4067  Number of Participants: 8    Type of Group: Cognitive Skills    Wellness Binder Information  Module Name:  emotional wellness  Session Number:  1    Patient's Goal:  validation of feelings    Notes:  pt acknowledged to have feelings validated it may be necessary to share feelings with others.     Status After Intervention:  Improved    Participation Level: Interactive    Participation Quality: Appropriate, Attentive and Sharing      Speech:  normal      Thought Process/Content: Logical      Affective Functioning: Congruent      Mood: congruent      Level of consciousness:  Alert, Oriented x4 and Attentive      Response to Learning: Able to verbalize current knowledge/experience      Endings: None Reported    Modes of Intervention: Education      Discipline Responsible: Psychoeducational Specialist      Signature:  Jude Eugene

## 2019-06-13 PROBLEM — F33.3 MAJOR DEPRESSIVE DISORDER, RECURRENT, SEVERE WITH PSYCHOTIC FEATURES (HCC): Status: ACTIVE | Noted: 2019-06-13

## 2019-06-13 PROCEDURE — 6370000000 HC RX 637 (ALT 250 FOR IP): Performed by: NURSE PRACTITIONER

## 2019-06-13 PROCEDURE — 1240000000 HC EMOTIONAL WELLNESS R&B

## 2019-06-13 PROCEDURE — 99231 SBSQ HOSP IP/OBS SF/LOW 25: CPT | Performed by: NURSE PRACTITIONER

## 2019-06-13 RX ADMIN — ESCITALOPRAM OXALATE 10 MG: 10 TABLET, FILM COATED ORAL at 08:16

## 2019-06-13 RX ADMIN — QUETIAPINE FUMARATE 200 MG: 200 TABLET ORAL at 20:46

## 2019-06-13 NOTE — PROGRESS NOTES
Progress Note  Anastacio Bustos  6/12/2019 10:46 PM  Subjective:   Admit Date:   6/10/2019      CC/ADMIT DX:       Interval History:   Reviewed overnight events and nursing notes. She has no medical complaints. I have reviewed all labs/diagnostics from the last 24hrs. ROS:   I have done a 10 point ROS and all are negative, except what is mentioned in the HPI. DIET GENERAL;    Medications:      QUEtiapine  200 mg Oral Nightly    escitalopram  10 mg Oral Daily    nicotine  1 patch Transdermal Daily           Objective:   Vitals: BP 97/67   Pulse 64   Temp 99.1 °F (37.3 °C) (Temporal)   Resp 15   Ht 5' (1.524 m)   Wt 105 lb (47.6 kg)   SpO2 98%   BMI 20.51 kg/m²      Intake/Output Summary (Last 24 hours) at 6/12/2019 2246  Last data filed at 6/12/2019 0825  Gross per 24 hour   Intake 240 ml   Output --   Net 240 ml     General appearance: alert and cooperative with exam  Lungs: clear to auscultation bilaterally  Heart: RRR  Abdomen: soft, non-tender; bowel sounds normal; no masses,  no organomegaly  Extremities: extremities normal, atraumatic, no cyanosis or edema  Neurologic:  No obvious focal neurologic deficits. Assessment and Plan:   Principal Problem:    Severe episode of recurrent major depressive disorder, with psychotic features ([address]  Active Problems:    Suicidal ideation  Resolved Problems:    Severe episode of recurrent major depressive disorder, without psychotic features ([address]    Hypotension    Plan:  1. Continue present medication(s)   2. She is medically stable. I will monitor for any changes or concerns. 3.  Follow with Psych      Discharge planning:    home     Reviewed treatment plans with the patient and/or family.              Electronically signed by Whit Colby MD on 6/12/2019 at 10:46 PM

## 2019-06-13 NOTE — PROGRESS NOTES
[facility] Adult Unit Daily Assessment  Nursing Progress Note    Room: [facility]/605-01   Name: Marianela Siddiqui   Age: 53 y.o. Gender: male   Dx: Severe episode of recurrent major depressive disorder, with psychotic features ([address]  Precautions: Suicide Risk  Inpatient Status: voluntary       Sleep: No,   Sleep Quality Good   Hours Slept: See rounding sheet. Sleep Medications: No  PRN Sleep Meds: No       Other PRN Meds: No   Med Compliant: Yes   Accu-Chek: No   Oxygen: No  CIWA/CINA: No          SI denies suicidal ideation    HI Negative for homicidal ideation        AVH:Absent      Depression: 5   Anxiety: 3       Appetite: good    Social: Yes   Speech: normal   Appearance: appropriately dressed and healthy looking    Group Participation: No  Participation LevelInteractive    Participation QualityAppropriate    Notes: Patient stated that he felt hopeful about his future. Patient reported that the group sessions are helping him. Patient denies any concerns. Patient calm and cooperative with staff and peers. Will continue to monitor.      Electronically signed by Jefferson Kumar RN on 6/12/19 at 11:26 PM
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
## 2019-06-13 NOTE — PLAN OF CARE
Problem: Altered Mood, Depressive Behavior:  Goal: Able to verbalize acceptance of life and situations over which he or she has no control  Description  Able to verbalize acceptance of life and situations over which he or she has no control  6/13/2019 1747 by Jesse Hernandez RN  Outcome: Ongoing  6/13/2019 1052 by Ananth Traylor LPC  Outcome: Ongoing  Goal: Able to verbalize and/or display a decrease in depressive symptoms  Description  Able to verbalize and/or display a decrease in depressive symptoms  Outcome: Ongoing  Goal: Ability to disclose and discuss suicidal ideas will improve  Description  Ability to disclose and discuss suicidal ideas will improve  Outcome: Ongoing  Goal: Able to verbalize support systems  Description  Able to verbalize support systems  Outcome: Ongoing  Goal: Absence of self-harm  Description  Absence of self-harm  Outcome: Ongoing  Goal: Patient specific goal  Description  Patient specific goal  Outcome: Ongoing  Goal: Participates in care planning  Description  Participates in care planning  Outcome: Ongoing     Problem: Suicide risk  Description  Suicide risk  Goal: Provide patient with safe environment  Description  Provide patient with safe environment  Outcome: Ongoing

## 2019-06-13 NOTE — PLAN OF CARE
Problem: Altered Mood, Depressive Behavior:  Goal: Able to verbalize acceptance of life and situations over which he or she has no control  Description  Able to verbalize acceptance of life and situations over which he or she has no control  Outcome: Ongoing      Group Therapy Note     Date: 6/13/2019  Start Time: 1000  End Time:  1045  Number of Participants: 8     Type of Group: Psychotherapy     Patient's Goal: Patient will process emotions and actions and how to relate to other or their with others. Patient discussed open communication and feelings and emotions. Notes:  Patient attended process group as scheduled, patient identified a issue to work on today regarding how they will interact and relate to others. Status After Intervention:  Improved     Participation Level:  Active Listener     Participation Quality: Appropriate, Attentive and Sharing        Speech:  normal        Thought Process/Content: Logical        Affective Functioning: Congruent        Mood: euthymic        Level of consciousness:  Alert        Response to Learning: Able to verbalize current knowledge/experience        Endings: None Reported     Modes of Intervention: Education, Support and Socialization        Discipline Responsible: /Counselor        Signature:  Lucia Burger [facility]

## 2019-06-14 VITALS
DIASTOLIC BLOOD PRESSURE: 64 MMHG | TEMPERATURE: 99.2 F | SYSTOLIC BLOOD PRESSURE: 97 MMHG | HEART RATE: 92 BPM | RESPIRATION RATE: 20 BRPM | BODY MASS INDEX: 20.62 KG/M2 | OXYGEN SATURATION: 95 % | HEIGHT: 60 IN | WEIGHT: 105 LBS

## 2019-06-14 PROBLEM — F33.3 MAJOR DEPRESSIVE DISORDER, RECURRENT, SEVERE WITH PSYCHOTIC FEATURES (HCC): Status: RESOLVED | Noted: 2019-06-13 | Resolved: 2019-06-14

## 2019-06-14 PROCEDURE — 6370000000 HC RX 637 (ALT 250 FOR IP): Performed by: NURSE PRACTITIONER

## 2019-06-14 PROCEDURE — 99238 HOSP IP/OBS DSCHRG MGMT 30/<: CPT | Performed by: NURSE PRACTITIONER

## 2019-06-14 RX ORDER — QUETIAPINE FUMARATE 200 MG/1
200 TABLET, FILM COATED ORAL NIGHTLY
Qty: 30 TABLET | Refills: 0 | Status: SHIPPED | OUTPATIENT
Start: 2019-06-14 | End: 2019-06-14

## 2019-06-14 RX ORDER — ESCITALOPRAM OXALATE 10 MG/1
10 TABLET ORAL DAILY
Qty: 14 TABLET | Refills: 0 | Status: ON HOLD | OUTPATIENT
Start: 2019-06-15 | End: 2019-07-11

## 2019-06-14 RX ORDER — ESCITALOPRAM OXALATE 10 MG/1
10 TABLET ORAL DAILY
Qty: 30 TABLET | Refills: 0 | Status: SHIPPED | OUTPATIENT
Start: 2019-06-15 | End: 2019-06-14

## 2019-06-14 RX ORDER — QUETIAPINE FUMARATE 200 MG/1
200 TABLET, FILM COATED ORAL NIGHTLY
Qty: 14 TABLET | Refills: 0 | Status: ON HOLD | OUTPATIENT
Start: 2019-06-14 | End: 2019-07-11 | Stop reason: HOSPADM

## 2019-06-14 RX ADMIN — ESCITALOPRAM OXALATE 10 MG: 10 TABLET, FILM COATED ORAL at 07:54

## 2019-06-14 NOTE — PROGRESS NOTES
Treatment Team Note:    LCSW met with [address] [address] team to discuss [address] plans. Progress/Behavior/Group Attendance: Yes    Target Symptoms/Reason for admission: SI, AH    Diagnoses: Auditory Hallucinations    UDS: Neg    BAL: Neg    AftercarePlan: [address] lives with: SW will meet with pt to gather information. Collateral obtained from: SW will meet with pt to gather release of information.   On:    Family Session: AYAN    Misc:

## 2019-06-14 NOTE — DISCHARGE SUMMARY
Discharge Summary     Patient ID:  Joesph Jane  [id number]  30 y.o.  1975    Admit date: 6/10/2019  Discharge date: 6/14/2019    Admitting Physician: Justina Castro MD   Attending Physician: Justina Castro MD  Discharge Provider: Cristobal Perera     Discharge Diagnoses: Major depressive disorder, recurrent, severe with psychotic features [facility]) [F33.3]    Admission Condition: fair    Discharged Condition: good    Indication for Admission: depression and hallucinations    HPI:  Patient is a 20-year-old  male who presents to the ER with suicidal ideations and hearing voices. He has had prior psychiatric hospitalizations with the last being in 2017. He has also had two prior suicide attempts. He was recently released from skilled nursing about 2 weeks ago and did 2 years for not paying his child support. He reports he has been living at a nursing home house for the past week. States he was getting his medications in skilled nursing however since he has been released he has not had his medications which were Seroquel and Lexapro. He has not been able to sleep (\"I do not sleep at all\") and started hearing voices to harm himself specifically to hang himself. The nursing staff found him this morning with a shirt around his neck and he reports he was trying to harm himself. Reports racing thoughts when he is not on his medications. Reports he hears voices however is unable to make out what type of voices they are. Reports poor energy, poor sleep, poor concentration and reports his appetite as \"fine. \"         Hospital Course:   Patient was admitted to the adult behavioral health floor and was acclimated to the floor. Labs were reviewed and physical exam was completed by Dr. Nancy Mccallum and associates. Home medications were reconciled. CASTRO was obtained and reviewed. Medication changes were made and patient tolerated well with no side effects. The risks and benefits of medications were reviewed with the patient.  He was started

## 2019-06-14 NOTE — PROGRESS NOTES
Progress Note  Mariola Saliva  6/13/2019 11:50 PM  Subjective:   Admit Date:   6/10/2019      CC/ADMIT DX:       Interval History:   Reviewed overnight events and nursing notes. He denies any physical complaints. I have reviewed all labs/diagnostics from the last 24hrs. ROS:   I have done a 10 point ROS and all are negative, except what is mentioned in the HPI. DIET GENERAL;    Medications:      QUEtiapine  200 mg Oral Nightly    escitalopram  10 mg Oral Daily    nicotine  1 patch Transdermal Daily           Objective:   Vitals: /70   Pulse 61   Temp 98.1 °F (36.7 °C) (Temporal)   Resp 16   Ht 5' (1.524 m)   Wt 105 lb (47.6 kg)   SpO2 97%   BMI 20.51 kg/m²    No intake or output data in the 24 hours ending 06/13/19 2350  General appearance: alert and cooperative with exam  Lungs: clear to auscultation bilaterally  Heart: RRR  Abdomen: soft, non-tender; bowel sounds normal; no masses,  no organomegaly  Extremities: extremities normal, atraumatic, no cyanosis or edema  Neurologic:  No obvious focal neurologic deficits. Assessment and Plan:   Principal Problem:    Severe episode of recurrent major depressive disorder, with psychotic features ([address]  Active Problems:    Suicidal ideation    Major depressive disorder, recurrent, severe with psychotic features ([address]  Resolved Problems:    Severe episode of recurrent major depressive disorder, without psychotic features ([address]    Hypotension    Plan:  1. Continue present medication(s)   2.  He remains medically stable. I will monitor for any changes or concerns. 3.  Follow with Psych      Discharge planning:    home     Reviewed treatment plans with the patient and/or family.              Electronically signed by Elio Castellanos MD on 6/13/2019 at 11:50 PM

## 2019-06-14 NOTE — PROGRESS NOTES
[facility]  Discharge Note  Bridge Appointment completed: Reviewed Discharge Instructions with patient. Patient verbalizes understanding and agreement with the discharge plan using the teachback method. Referral for Outpatient Tobacco Cessation Counseling, upon discharge (darion X if applicable and completed):    ( )  Hospital staff assisted patient to call Quit Line or faxed referral                                   during hospitalization                  ( )  Recognizing danger situations (included triggers and roadblocks), if not completed on admission                    ( )  Coping skills (new ways to manage stress, exercise, relaxation techniques, changing routine, distraction), if not completed on admission                                                           ( )  Basic information about quitting (benefits of quitting, techniques in how to quit, available resources, if not completed on admission  ( ) [facility]   ( ) Patient refused referral  ( ) Patient refused counseling  ( x) Patient refused smoking cessation medication upon discharge    Vaccinations (darion X if applicable and completed): ( x) Patient states already received influenza vaccine elsewhere  ( ) Patient received influenza vaccine during this hospitalization  ( ) Patient refused influenza vaccine at this time      Pt discharged with followings belongings:   Dentures: None  Vision - Corrective Lenses: None  Hearing Aid: None  Jewelry: None  Body Piercings Removed: N/A  Clothing: Socks, Footwear, Pants, Shirt, Undergarments (Comment)  Were All Patient Medications Collected?: Not Applicable  Other Valuables: None     Valuables retrieved from safe and returned to patient. Patient left department with Departure Mode: By self, In cab via Mobility at Departure: Ambulatory, discharged to Discharged to: Private Residence.  Patient education on aftercare instructions: yes  Patient verbalize understanding of AVS:  yes. SI? no plan AVH? none HI? Negative for homicidal ideation       Status EXAM upon discharge:  Status and Exam  Normal: No  Facial Expression: Flat, Avoids Gaze  Affect: Constricted, Blunt  Level of Consciousness: Alert  Mood:Normal: No  Mood: Depressed, Anxious  Motor Activity:Normal: No  Motor Activity: Decreased  Interview Behavior: Aggressive  Preception: [place] to Person, Jodell Punter to Time, [place] to Situation, [place] to Place  Attention:Normal: No  Attention: Distractible  Thought Processes: Circumstantial  Thought Content:Normal: No  Thought Content: Preoccupations, Poverty of Content  Hallucinations: None  Delusions: No  Memory:Normal: No  Memory: Poor Recent  Insight and Judgment: No  Insight and Judgment: Poor Insight, Poor Judgment, Unmotivated  Present Suicidal Ideation: No  Present Homicidal Ideation: No        Bridge appointment. Reviewed Discharge instructions with patient and/or guardian.     Patient verbalizes understanding and agreement with the discharge plan using the teach back method

## 2019-06-14 NOTE — PROGRESS NOTES
[facility] Adult Unit Daily Assessment  Nursing Progress Note    Room: [facility]   Name: Luis Jaimes   Age: 53 y.o. Gender: male   Dx: Severe episode of recurrent major depressive disorder, with psychotic features ([address]  Precautions: suicide risk  Inpatient Status: voluntary       Sleep: Yes,   Sleep Quality Good   Hours Slept: 7   Sleep Medications: Yes  PRN Sleep Meds: No       Other PRN Meds: No   Med Compliant: Yes   Accu-Chek: No   Oxygen: No  CIWA/CINA: No          SI denies suicidal ideation    HI Negative for homicidal ideation        AVH:Absent      Depression: 5   Anxiety: 4      Appetite: no change from normal    Social: Yes   Speech: normal   Appearance: appropriately dressed and disheveled    Group Participation: Yes  Participation LevelMinimal    Participation QualityAppropriate    Notes:   Patient up in tv area with peers. Denies any s/s distress or pain. Compliant with care.   Electronically signed by Barak Whalen LPN on 5/60/16 at 9:28 AM

## 2019-06-14 NOTE — PROGRESS NOTES
Discharge Note     Pt discharging on this date. Pt denies SI, HI, and AVH at this time. Pt reports improvement in behavior and is leaving unit in overall good condition. SW and pt discussed pt's follow up appointments and importance of attending appointments as scheduled, pt voiced understanding and agreement. Pt and SW also discussed pt safety plan and pt able to verbally identify: warning signs, coping strategies, places and people that help make the pt feel better/distract negative thoughts, friends/family/agencies/professionals the pt can reach out to in a crisis, and something that is important to the pt/worth living for. Pt provided the national suicide prevention hotline number (7-666-257-396-303-8941) as well as local community behavioral health [facility]) crisis number for emergencies (9-325-009-434-082-1428). Pt to follow up with:  [address] [place] [address]) on __6/_18_/19 @ 09:00 AM. Patient will follow up with Dr. Aracelis Velazquez at [facility] for medication management, patient will be seen on _6_/24 /19 @ 08:00 AM for the med management appt.      Referral to out patient tobacco cessation counseling treatment:  Made at discharge with (company) on (date) at (time)  Patient refused tobacco cessation counseling    SW offered to assist pt with transportation, pt reports

## 2019-06-14 NOTE — PROGRESS NOTES
Group Therapy Note    Start Time: 393  End Time:  830  Number of Participants: 7    Type of Group: Community Meeting       Patient's Goal:        Notes: patient didn't attend group      Participation Level:       Participation Quality:        Thought Process/Content:       Affective Functioning:       Mood:       Level of consciousness:        Modes of Intervention: Support      Discipline Responsible: Behavioral Health Tech II      Signature:  Yandel Amaro

## 2019-06-14 NOTE — PLAN OF CARE
Group Therapy Note    Date: 6/14/2019  Start Time: 1100  End Time:  9126  Number of Participants: 5    Type of Group: Psychotherapy    Wellness Binder Information  Module Name:  staying well  Session Number:  1    Patient's Goal:  daily maintenance and coping skills    Notes:  pt was verbally prompted to attend group. Pt refused. Information about coping skills was provided. Status After Intervention:      Participation Level:     Participation Quality:       Speech:         Thought Process/Content:       Affective Functioning:       Mood:       Level of consciousness:        Response to Learning:       Endings:     Modes of Intervention:       Discipline Responsible: Psychoeducational Specialist      Signature:  Rohan Mackay

## 2019-06-14 NOTE — PLAN OF CARE
Problem: Altered Mood, Depressive Behavior:  Goal: Ability to disclose and discuss suicidal ideas will improve  6/14/2019 1153 by Piter Yaeñz  Outcome: Ongoing                                                                       Group Therapy Note    Date: 6/14/2019  Start Time: 1100  End Time:  8214  Number of Participants: 5    Type of Group: Psychotherapy    Wellness Binder Information  Module Name:  staying well  Session Number:  1    Patient's Goal:  daily maintenance and coping skills    Notes:  pt acknowledged use of positive coping skills daily to help stay well.     Status After Intervention:  Improved    Participation Level: Interactive    Participation Quality: Appropriate, Attentive and Sharing      Speech:  normal      Thought Process/Content: Logical      Affective Functioning: Congruent      Mood: congruent      Level of consciousness:  Alert, Oriented x4 and Attentive      Response to Learning: Able to verbalize current knowledge/experience      Endings: None Reported    Modes of Intervention: Education      Discipline Responsible: Psychoeducational Specialist      Signature:  Piter Yañez

## 2019-07-08 ENCOUNTER — HOSPITAL ENCOUNTER (INPATIENT)
Age: 44
LOS: 3 days | Discharge: HOME | DRG: 885 | End: 2019-07-11
Attending: EMERGENCY MEDICINE | Admitting: PSYCHIATRY & NEUROLOGY
Payer: MEDICAID

## 2019-07-08 DIAGNOSIS — R45.851 DEPRESSION WITH SUICIDAL IDEATION: Primary | ICD-10-CM

## 2019-07-08 DIAGNOSIS — F32.A DEPRESSION WITH SUICIDAL IDEATION: Primary | ICD-10-CM

## 2019-07-08 PROBLEM — F17.210 CIGARETTE NICOTINE DEPENDENCE WITHOUT COMPLICATION: Status: ACTIVE | Noted: 2019-07-08

## 2019-07-08 LAB
ALBUMIN SERPL-MCNC: 4.2 G/DL (ref 3.5–5.2)
ALP BLD-CCNC: 65 U/L (ref 40–130)
ALT SERPL-CCNC: 23 U/L (ref 5–41)
AMPHETAMINE SCREEN, URINE: NEGATIVE
ANION GAP SERPL CALCULATED.3IONS-SCNC: 12 MMOL/L (ref 7–19)
AST SERPL-CCNC: 24 U/L (ref 5–40)
BARBITURATE SCREEN URINE: NEGATIVE
BASOPHILS ABSOLUTE: 0 K/UL (ref 0–0.2)
BASOPHILS RELATIVE PERCENT: 0.4 % (ref 0–1)
BENZODIAZEPINE SCREEN, URINE: NEGATIVE
BILIRUB SERPL-MCNC: <0.2 MG/DL (ref 0.2–1.2)
BUN BLDV-MCNC: 19 MG/DL (ref 6–20)
CALCIUM SERPL-MCNC: 9.2 MG/DL (ref 8.6–10)
CANNABINOID SCREEN URINE: NEGATIVE
CHLORIDE BLD-SCNC: 104 MMOL/L (ref 98–111)
CO2: 26 MMOL/L (ref 22–29)
COCAINE METABOLITE SCREEN URINE: NEGATIVE
CREAT SERPL-MCNC: 0.8 MG/DL (ref 0.5–1.2)
EOSINOPHILS ABSOLUTE: 0.2 K/UL (ref 0–0.6)
EOSINOPHILS RELATIVE PERCENT: 1.6 % (ref 0–5)
ETHANOL: <10 MG/DL (ref 0–0.08)
GFR NON-AFRICAN AMERICAN: >60
GLUCOSE BLD-MCNC: 107 MG/DL (ref 74–109)
HCT VFR BLD CALC: 44.4 % (ref 42–52)
HEMOGLOBIN: 14 G/DL (ref 14–18)
LYMPHOCYTES ABSOLUTE: 2.6 K/UL (ref 1.1–4.5)
LYMPHOCYTES RELATIVE PERCENT: 25.7 % (ref 20–40)
Lab: NORMAL
MCH RBC QN AUTO: 26.2 PG (ref 27–31)
MCHC RBC AUTO-ENTMCNC: 31.5 G/DL (ref 33–37)
MCV RBC AUTO: 83 FL (ref 80–94)
MONOCYTES ABSOLUTE: 0.6 K/UL (ref 0–0.9)
MONOCYTES RELATIVE PERCENT: 5.6 % (ref 0–10)
NEUTROPHILS ABSOLUTE: 6.7 K/UL (ref 1.5–7.5)
NEUTROPHILS RELATIVE PERCENT: 66.3 % (ref 50–65)
OPIATE SCREEN URINE: NEGATIVE
PDW BLD-RTO: 14.2 % (ref 11.5–14.5)
PLATELET # BLD: 259 K/UL (ref 130–400)
PMV BLD AUTO: 9.6 FL (ref 9.4–12.4)
POTASSIUM SERPL-SCNC: 4.2 MMOL/L (ref 3.5–5)
RBC # BLD: 5.35 M/UL (ref 4.7–6.1)
SODIUM BLD-SCNC: 142 MMOL/L (ref 136–145)
TOTAL PROTEIN: 6.6 G/DL (ref 6.6–8.7)
WBC # BLD: 10.1 K/UL (ref 4.8–10.8)

## 2019-07-08 PROCEDURE — G0480 DRUG TEST DEF 1-7 CLASSES: HCPCS

## 2019-07-08 PROCEDURE — 99285 EMERGENCY DEPT VISIT HI MDM: CPT | Performed by: EMERGENCY MEDICINE

## 2019-07-08 PROCEDURE — 99285 EMERGENCY DEPT VISIT HI MDM: CPT

## 2019-07-08 PROCEDURE — 1240000000 HC EMOTIONAL WELLNESS R&B

## 2019-07-08 PROCEDURE — 85025 COMPLETE CBC W/AUTO DIFF WBC: CPT

## 2019-07-08 PROCEDURE — 80053 COMPREHEN METABOLIC PANEL: CPT

## 2019-07-08 PROCEDURE — 80307 DRUG TEST PRSMV CHEM ANLYZR: CPT

## 2019-07-08 PROCEDURE — 90792 PSYCH DIAG EVAL W/MED SRVCS: CPT | Performed by: NURSE PRACTITIONER

## 2019-07-08 PROCEDURE — 36415 COLL VENOUS BLD VENIPUNCTURE: CPT

## 2019-07-08 PROCEDURE — 6370000000 HC RX 637 (ALT 250 FOR IP): Performed by: NURSE PRACTITIONER

## 2019-07-08 RX ORDER — ESCITALOPRAM OXALATE 10 MG/1
10 TABLET ORAL DAILY
Status: DISCONTINUED | OUTPATIENT
Start: 2019-07-08 | End: 2019-07-11 | Stop reason: HOSPADM

## 2019-07-08 RX ORDER — NICOTINE 21 MG/24HR
1 PATCH, TRANSDERMAL 24 HOURS TRANSDERMAL DAILY
Status: DISCONTINUED | OUTPATIENT
Start: 2019-07-08 | End: 2019-07-11 | Stop reason: HOSPADM

## 2019-07-08 RX ORDER — ACETAMINOPHEN 325 MG/1
650 TABLET ORAL EVERY 4 HOURS PRN
Status: DISCONTINUED | OUTPATIENT
Start: 2019-07-08 | End: 2019-07-11 | Stop reason: HOSPADM

## 2019-07-08 RX ORDER — QUETIAPINE FUMARATE 50 MG/1
100 TABLET, EXTENDED RELEASE ORAL NIGHTLY
Status: DISCONTINUED | OUTPATIENT
Start: 2019-07-08 | End: 2019-07-11 | Stop reason: HOSPADM

## 2019-07-08 RX ADMIN — QUETIAPINE FUMARATE 100 MG: 50 TABLET, EXTENDED RELEASE ORAL at 21:02

## 2019-07-08 RX ADMIN — ESCITALOPRAM OXALATE 10 MG: 10 TABLET, FILM COATED ORAL at 16:07

## 2019-07-08 ASSESSMENT — ENCOUNTER SYMPTOMS
VOMITING: 0
NAUSEA: 0
COUGH: 0
BACK PAIN: 0
RHINORRHEA: 0
SHORTNESS OF BREATH: 0
ABDOMINAL PAIN: 0
SORE THROAT: 0
DIARRHEA: 0

## 2019-07-08 ASSESSMENT — LIFESTYLE VARIABLES: HISTORY_ALCOHOL_USE: YES

## 2019-07-08 ASSESSMENT — SLEEP AND FATIGUE QUESTIONNAIRES
DIFFICULTY ARISING: NO
DIFFICULTY STAYING ASLEEP: YES
DIFFICULTY FALLING ASLEEP: YES
DO YOU USE A SLEEP AID: NO
SLEEP PATTERN: INSOMNIA
AVERAGE NUMBER OF SLEEP HOURS: 0
RESTFUL SLEEP: NO
DO YOU HAVE DIFFICULTY SLEEPING: YES

## 2019-07-08 ASSESSMENT — PATIENT HEALTH QUESTIONNAIRE - PHQ9: SUM OF ALL RESPONSES TO PHQ QUESTIONS 1-9: 21
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
## 2019-07-08 NOTE — ED PROVIDER NOTES
Temp Source Pulse Resp SpO2 Height Weight   103/71 98.3 °F (36.8 °C) Oral 64 15 94 % 5' 3\" (1.6 m) 100 lb (45.4 kg)       Physical Exam   Constitutional: He is oriented to person, place, and time. He appears well-developed and well-nourished. No distress. HENT:   Head: Normocephalic and atraumatic. Right Ear: External ear normal.   Left Ear: External ear normal.   Mouth/Throat: Oropharynx is clear and moist.   Eyes: Conjunctivae and EOM are normal.   Neck: Normal range of motion. Neck supple. No tracheal deviation present. Cardiovascular: Normal rate, regular rhythm and normal heart sounds. No murmur heard. Pulmonary/Chest: Breath sounds normal. He has no wheezes. He has no rales. Abdominal: Soft. He exhibits no mass. There is no tenderness. Musculoskeletal: Normal range of motion. He exhibits no edema. Neurological: He is alert and oriented to person, place, and time. Skin: Skin is warm and dry. He is not diaphoretic. Psychiatric: His affect is blunt. Thought content is not paranoid and not delusional. He exhibits a depressed mood. He expresses suicidal ideation. He expresses no homicidal ideation. Vitals reviewed. DIAGNOSTIC RESULTS           No orders to display           LABS:  Labs Reviewed   CBC WITH AUTO DIFFERENTIAL - Abnormal; Notable for the following components:       Result Value    MCH 26.2 (*)     MCHC 31.5 (*)     Neutrophils % 66.3 (*)     All other components within normal limits   COMPREHENSIVE METABOLIC PANEL   ETHANOL   URINE DRUG SCREEN       All other labs were within normal range or not returned as of this dictation.     EMERGENCY DEPARTMENT COURSE and DIFFERENTIAL DIAGNOSIS/MDM:   Vitals:    Vitals:    07/08/19 0432 07/08/19 0616 07/08/19 0635   BP: 103/71 100/73 109/71   Pulse: 64 81 60   Resp: 15 16 20   Temp: 98.3 °F (36.8 °C) 97 °F (36.1 °C) 97.6 °F (36.4 °C)   TempSrc: Oral  Temporal   SpO2: 94% 96% 98%   Weight: 100 lb (45.4 kg)  103 lb (46.7 kg)   Height: 5' 3\"

## 2019-07-08 NOTE — PLAN OF CARE
Problem: Health Behavior:  Goal: Ability to verbalize adaptive coping strategies to use when suicidal feelings occur will improve  Description  Ability to verbalize adaptive coping strategies to use when suicidal feelings occur will improve  7/8/2019 1403 by Adelita Parker RN  Outcome: Ongoing  7/8/2019 1203 by Earnestine Vernon  Outcome: Ongoing  Goal: Ability to verbalize adaptive coping strategies to use when the urge to self-mutilate occurs will improve  Description  Ability to verbalize adaptive coping strategies to use when the urge to self-mutilate occurs will improve  Outcome: Ongoing     Problem: Safety:  Goal: Ability to contract for his/her safety will improve  Description  Ability to contract for his/her safety will improve  7/8/2019 1403 by Adelita Parker RN  Outcome: Ongoing  7/8/2019 1305 by Nkechi Marina LPC  Outcome: Ongoing     Problem: Anxiety:  Goal: Level of anxiety will decrease  Description  Level of anxiety will decrease  Outcome: Ongoing
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
## 2019-07-08 NOTE — PROGRESS NOTES
Group Therapy Note    Start Time: 900  End Time:  528  Number of Participants: 10    Type of Group: Community Meeting       Patient's Goal:       Notes:  Patient did not attend group. Participation Level:       Participation Quality:        Thought Process/Content:       Affective Functioning:       Mood:       Level of consciousness:      Modes of Intervention: Support      Discipline Responsible: Behavioral Health Tech II      Signature:  Kath Benavidez
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
## 2019-07-08 NOTE — ED TRIAGE NOTES
Patient arrive by [facility] with c/o SI, with plan. Patient has thought of hanging himself.   Patient has previous suicide attempt in 2003

## 2019-07-08 NOTE — BH NOTE
Group Therapy Note    Date: 7/8/2019  Start Time: 1330  End Time:  1400  Number of Participants: 8    Type of Group: Spirituality Group    Wellness Binder Information  Module Name:  Mindfulness  Session Number:      Patient's Goal:  Skills in meditation and relaxation    Notes:      Status After Intervention:  Improved    Participation Level:  Active Listener and Interactive    Participation Quality: Appropriate, Attentive and Sharing      Speech:  normal      Thought Process/Content:       Affective Functioning: Congruent      Mood: euthymic, calm      Level of consciousness:  Oriented x4 and Attentive      Response to Learning: Able to verbalize current knowledge/experience and Capable of insight      Endings:     Modes of Intervention: Education and Activity      Discipline Responsible:       Signature:  Dayna Perez MA [facility]

## 2019-07-09 PROCEDURE — 99231 SBSQ HOSP IP/OBS SF/LOW 25: CPT | Performed by: NURSE PRACTITIONER

## 2019-07-09 PROCEDURE — 1240000000 HC EMOTIONAL WELLNESS R&B

## 2019-07-09 PROCEDURE — 6370000000 HC RX 637 (ALT 250 FOR IP): Performed by: NURSE PRACTITIONER

## 2019-07-09 RX ADMIN — ESCITALOPRAM OXALATE 10 MG: 10 TABLET, FILM COATED ORAL at 08:42

## 2019-07-09 RX ADMIN — QUETIAPINE FUMARATE 100 MG: 50 TABLET, EXTENDED RELEASE ORAL at 20:44

## 2019-07-09 NOTE — PROGRESS NOTES
[facility] Adult Unit Daily Assessment  Nursing Progress Note    Room: [facility]   Name: Mk Goldberg   Age: 53 y.o. Gender: male   Dx: Severe episode of recurrent major depressive disorder, with psychotic features ([address]  Precautions: Suicide  Inpatient Status: voluntary       Sleep: Yes,   Sleep Quality Good   Hours Slept: see flow sheet in the AM   Sleep Medications: Yes  PRN Sleep Meds: No       Other PRN Meds: No   Med Compliant: Yes   Accu-Chek: No   Oxygen: No  CIWA/CINA: No          SI denies suicidal ideation    HI Negative for homicidal ideation        AVH:Absent      Depression: 0   Anxiety: 5       Appetite: good    Social: No   Speech: normal   Appearance: appropriately dressed, appropriately groomed and healthy looking    Group Participation: No  Participation LevelNone    Participation Roland Garcia    Notes: Pt stayed in his room resting/sleeping all PM. He reported doing \"good, better\" with fairly bright affect.     Electronically signed by Rush Payton RN on 7/8/19 at 11:57 PM

## 2019-07-09 NOTE — PROGRESS NOTES
Group Therapy Note    Date: 7/9/2019  Start Time: 0900  End Time:  0915  Number of Participants: 15    Type of Group: Community Meeting          Participation Level: Minimal    Participation Quality: Appropriate      Speech:  hesitant      Thought Process/Content: Logical      Affective Functioning: Congruent      Mood: anxious      Level of consciousness:  Alert, Oriented x4 and Attentive      Response to Learning: Able to verbalize current knowledge/experience, Able to verbalize/acknowledge new learning and Able to retain information        Signature:  Jennifer Valnetine RN

## 2019-07-09 NOTE — PLAN OF CARE
Problem: Health Behavior:  Goal: Ability to verbalize adaptive coping strategies to use when suicidal feelings occur will improve  7/9/2019 1158 by Bharat Kim  Outcome: Ongoing                                                                       Group Therapy Note    Date: 7/9/2019  Start Time: 1100  End Time:  0440  Number of Participants: 11    Type of Group: Psychoeducation    Wellness Binder Information  Module Name:  staying well  Session Number:  1    Patient's Goal:  daily maintenance and coping skills    Notes:  pt acknowledged use of positive coping skills daily to help stay well.     Status After Intervention:  Improved    Participation Level: Interactive    Participation Quality: Appropriate, Attentive and Sharing      Speech:  normal      Thought Process/Content: Logical      Affective Functioning: Congruent      Mood: congruent      Level of consciousness:  Alert, Oriented x4 and Attentive      Response to Learning: Able to verbalize current knowledge/experience      Endings: None Reported    Modes of Intervention: Education      Discipline Responsible: Psychoeducational Specialist      Signature:  Bharat Kim

## 2019-07-09 NOTE — PLAN OF CARE
Problem: Health Behavior:  Goal: Ability to verbalize adaptive coping strategies to use when suicidal feelings occur will improve  Outcome: Ongoing  Goal: Ability to verbalize adaptive coping strategies to use when the urge to self-mutilate occurs will improve  Outcome: Ongoing     Problem: Safety:  Goal: Ability to contract for his/her safety will improve  Outcome: Ongoing     Problem: Anxiety:  Goal: Level of anxiety will decrease  Outcome: Ongoing     Problem: Suicide risk  Goal: Provide patient with safe environment  Outcome: Ongoing

## 2019-07-10 PROCEDURE — 1240000000 HC EMOTIONAL WELLNESS R&B

## 2019-07-10 PROCEDURE — 99231 SBSQ HOSP IP/OBS SF/LOW 25: CPT | Performed by: NURSE PRACTITIONER

## 2019-07-10 PROCEDURE — 6370000000 HC RX 637 (ALT 250 FOR IP): Performed by: NURSE PRACTITIONER

## 2019-07-10 RX ADMIN — QUETIAPINE FUMARATE 100 MG: 50 TABLET, EXTENDED RELEASE ORAL at 20:45

## 2019-07-10 RX ADMIN — ESCITALOPRAM OXALATE 10 MG: 10 TABLET, FILM COATED ORAL at 08:40

## 2019-07-10 NOTE — PLAN OF CARE
Problem: Health Behavior:  Goal: Ability to verbalize adaptive coping strategies to use when suicidal feelings occur will improve  Outcome: Ongoing                                                                       Group Therapy Note    Date: 7/10/2019  Start Time: 1100  End Time:  4494  Number of Participants: 9    Type of Group: Psychoeducation    Wellness Binder Information  Module Name:  Emotional wellness  Session Number:  1    Patient's Goal:  validation of feelings    Notes:  pt acknowledged to have feelings validated it may be necessary to share feelings with others.     Status After Intervention:  Improved    Participation Level: Interactive    Participation Quality: Appropriate, Attentive and Sharing      Speech:  normal      Thought Process/Content: Logical      Affective Functioning: Congruent      Mood: congruent      Level of consciousness:  Alert, Oriented x4 and Attentive      Response to Learning: Able to verbalize current knowledge/experience      Endings: None Reported    Modes of Intervention: Education      Discipline Responsible: Psychoeducational Specialist      Signature:  Kevin Amaral

## 2019-07-10 NOTE — PROGRESS NOTES
[facility] Adult Unit Daily Assessment  Nursing Progress Note     Room: [facility]   Name: Eileen Bettencourt   Age: 53 y.o. Gender: male   Dx: Severe episode of recurrent major depressive disorder, with psychotic features ([address]  Precautions: Suicide  Inpatient Status: voluntary         Sleep: Yes,   Sleep Quality Good   Hours Slept: see flow sheet in the AM   Sleep Medications: Yes  PRN Sleep Meds: No         Other PRN Meds: No   Med Compliant: Yes   Accu-Chek: No   Oxygen: No  CIWA/CINA: No             SI denies suicidal ideation    HI Negative for homicidal ideation        AVH:Absent        Depression: 0   Anxiety: 3         Appetite: good    Social: No   Speech: normal   Appearance: appropriately dressed, appropriately groomed and healthy looking     Group Participation: No  Participation Level:None    Participation Quality:Lethargic     Notes: pt briefly out of bed this evening looking for bedtime medications. A&Ox4, calm and cooperative, at med pass.      Electronically signed by Johnnie Garcia RN on 7/9/2019 at 11:18 PM

## 2019-07-10 NOTE — PROGRESS NOTES
Group Therapy Note    Date: 7/10/2019  Start Time: 1600  End Time:  1630  Number of Participants: 16    Type of Group: Healthy Living/Wellness        Status After Intervention:  Unchanged    Participation Level: Minimal    Participation Quality: Resistant      Speech:  hesitant      Thought Process/Content: Logical  Linear      Affective Functioning: Congruent      Mood: anxious      Level of consciousness:  Alert, Oriented x4 and Attentive      Response to Learning: Able to verbalize current knowledge/experience and Able to verbalize/acknowledge new learning      Endings: None Reported    Modes of Intervention: Education      Discipline Responsible: Registered Nurse      Signature:  Maria Dolores Flores RN
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
## 2019-07-11 VITALS
TEMPERATURE: 99 F | DIASTOLIC BLOOD PRESSURE: 78 MMHG | HEART RATE: 76 BPM | SYSTOLIC BLOOD PRESSURE: 114 MMHG | OXYGEN SATURATION: 98 % | WEIGHT: 103 LBS | BODY MASS INDEX: 18.25 KG/M2 | RESPIRATION RATE: 18 BRPM | HEIGHT: 63 IN

## 2019-07-11 PROBLEM — F33.3 MAJOR DEPRESSIVE DISORDER, RECURRENT, SEVERE WITH PSYCHOTIC FEATURES (HCC): Status: ACTIVE | Noted: 2019-07-11

## 2019-07-11 PROCEDURE — 5130000000 HC BRIDGE APPOINTMENT

## 2019-07-11 PROCEDURE — 99238 HOSP IP/OBS DSCHRG MGMT 30/<: CPT | Performed by: NURSE PRACTITIONER

## 2019-07-11 PROCEDURE — 6370000000 HC RX 637 (ALT 250 FOR IP): Performed by: NURSE PRACTITIONER

## 2019-07-11 RX ORDER — QUETIAPINE FUMARATE 50 MG/1
100 TABLET, EXTENDED RELEASE ORAL NIGHTLY
Qty: 60 TABLET | Refills: 0 | Status: SHIPPED | OUTPATIENT
Start: 2019-07-11

## 2019-07-11 RX ORDER — ESCITALOPRAM OXALATE 10 MG/1
10 TABLET ORAL DAILY
Qty: 30 TABLET | Refills: 0 | Status: SHIPPED | OUTPATIENT
Start: 2019-07-11 | End: 2019-08-10

## 2019-07-11 RX ADMIN — ESCITALOPRAM OXALATE 10 MG: 10 TABLET, FILM COATED ORAL at 08:52

## 2019-07-11 NOTE — PLAN OF CARE
Group Therapy Note    Date: 7/11/2019  Start Time: 1000  End Time:  1100  Number of Participants: 12    Type of Group: Psychoeducation    Wellness Binder Information  Module Name:  Men's Issues  Session Number:  1    Patient's Goal:  To improve knowledge of effective stress management techniques    Notes:  Pt did not attend group discussion. Pt was invited/encouraged. Status After Intervention:      Participation Level:     Participation Quality:       Speech:         Thought Process/Content:       Affective Functioning:       Mood:       Level of consciousness:        Response to Learning:       Endings:     Modes of Intervention:       Discipline Responsible:       Signature:  Estela Longoria

## 2019-07-11 NOTE — PLAN OF CARE
Problem: Health Behavior:  Goal: Ability to verbalize adaptive coping strategies to use when suicidal feelings occur will improve  Description  Ability to verbalize adaptive coping strategies to use when suicidal feelings occur will improve  Outcome: Met This Shift  Goal: Ability to verbalize adaptive coping strategies to use when the urge to self-mutilate occurs will improve  Description  Ability to verbalize adaptive coping strategies to use when the urge to self-mutilate occurs will improve  Outcome: Met This Shift     Problem: Safety:  Goal: Ability to contract for his/her safety will improve  Description  Ability to contract for his/her safety will improve  Outcome: Met This Shift     Problem: Anxiety:  Goal: Level of anxiety will decrease  Description  Level of anxiety will decrease  Outcome: Met This Shift     Problem: Suicide risk  Goal: Provide patient with safe environment  Description  Provide patient with safe environment  Outcome: Met This Shift

## 2019-07-11 NOTE — BH NOTE
BHI Daily Shift Assessment  Nursing Progress Note    Room: [facility] Name: Tierney Leonard Age: 53 y.o. Gender: male   Dx: Severe episode of recurrent major depressive disorder, with psychotic features ([facility] [address]  Precautions: close watch and suicide risk  Target Symptoms:   Accu-Chek: NoSleep: Yes,Sleep Quality Good SI denies AVH denies [facility]  ADLs: No Speech: normal Depression: 3 Anxiety: 3   Participation LevelMinimal  Visitation:   Participation QualityAppropriate    Complaints: PT expresses no complaints    Notes: Pt ALOx4, cooperative, isolative to room, med compliant, eats well, no ADLs. Signature:  Ora Gonzales RN

## 2019-07-11 NOTE — DISCHARGE INSTR - COC
Worker signature: {Esignature:025236519}    PHYSICIAN SECTION    Prognosis: {Prognosis:5476551015}    Condition at Discharge: [address] Patient Condition:488772745}    Rehab Potential (if transferring to Rehab): {Prognosis:5126905798}    Recommended Labs or Other Treatments After Discharge: ***    Physician Certification: I certify the above information and transfer of Dudley Fuller  is necessary for the continuing treatment of the diagnosis listed and that he requires {Admit to Appropriate Level of Care:93171} for {GREATER/LESS:115335825} 30 days.      Update Admission H&P: {CHP DME Changes in SDRS}    PHYSICIAN SIGNATURE:  {Esignature:475605015}

## 2019-07-12 NOTE — DISCHARGE SUMMARY
07/08/2019    K 4.2 07/08/2019     07/08/2019    CO2 26 07/08/2019    BUN 19 07/08/2019    CREATININE 0.8 07/08/2019    GLUCOSE 107 07/08/2019    CALCIUM 9.2 07/08/2019      Lab Results   Component Value Date    TSHFT4 0.83 06/11/2019    TSH 0.75 08/03/2017     Lab Results   Component Value Date    VITD25 30.1 06/11/2019       Results for Magda Barakat (MRN [id number]) as of 7/12/2019 08:51   Ref. Range 6/12/2019 05:35   Cholesterol, Total Latest Ref Range: 160 - 199 mg/dL 219 (H)   HDL Cholesterol Latest Ref Range: 55 - 121 mg/dL 50 (L)   LDL Calculated Latest Ref Range: <100 mg/dL 144   Triglycerides Latest Ref Range: 0 - 149 mg/dL 124     Results for Magda Barakat (MRN [id number]) as of 7/12/2019 08:51   Ref. Range 7/8/2019 04:34   Albumin Latest Ref Range: 3.5 - 5.2 g/dL 4.2   Alk Phos Latest Ref Range: 40 - 130 U/L 65   ALT Latest Ref Range: 5 - 41 U/L 23   AST Latest Ref Range: 5 - 40 U/L 24   Bilirubin Latest Ref Range: 0.2 - 1.2 mg/dL <0.2   Total Protein Latest Ref Range: 6.6 - 8.7 g/dL 6.6     Results for Magda Barakat (MRN [id number]) as of 7/12/2019 08:51   Ref. Range 7/8/2019 04:43   Amphetamine Screen, Urine Latest Ref Range: Negative <1000 ng/mL  Negative   Barbiturate Screen, Ur Latest Ref Range: Negative < 200 ng/mL  Negative   Benzodiazepine Screen, Urine Latest Ref Range: Negative <100 ng/mL  Negative   Cannabinoid Scrn, Ur Latest Ref Range: Negative <50 ng/mL  Negative   Opiate Scrn, Ur Latest Ref Range: Negative < 300 ng/mL  Negative   Cocaine Metabolite Screen, Urine Latest Ref Range: Negative <300 ng/mL  Negative     Results for Magda Barakat (MRN [id number]) as of 7/12/2019 08:51   Ref.  Range 6/11/2019 05:21   Vitamin B-12 Latest Ref Range: 211 - 946 pg/mL 446       Treatments: therapies: RN and SW    Alert, Oriented X 4  Appearance:  Grooming and Hygiene attended to  Speech with Regular Rate and Rhythm  Eye Contact:  Good  No Psychomotor Agitation/Retardation Noted  Attitude:  Cooperative  Mood:  \"I am feeling pretty good. \"  Affective: Congruent, appropriate to the situation, with a normal range and intensity  Thought Processes:  Coherently communicated, logical and goal oriented  Thought Content: At this time No Suicidal Ideation, No Homicidal Ideation, No Auditory or Visual       Hallucinations, NO Overt Delusions  Insight:  Present  Judgement:  Normal  Memory is intact for both remote and recent  Intellectual Functioning:  Within the [address] of Knowledge:  Adequate  Attention and Concentration:  Adequate        Discharge Exam:  Please see medical note  Gait Stable  Speaks in full sentence without shortness of air    Disposition: home    Patient Instructions:   Discharge Medication List as of 7/11/2019 11:52 AM      START taking these medications    Details   QUEtiapine (SEROQUEL XR) 50 MG extended release tablet Take 2 tablets by mouth nightly, Disp-60 tablet, R-0Normal         CONTINUE these medications which have CHANGED    Details   escitalopram (LEXAPRO) 10 MG tablet Take 1 tablet by mouth daily, Disp-30 tablet, R-0Normal         STOP taking these medications       QUEtiapine (SEROQUEL) 200 MG tablet Comments:   Reason for Stopping:             Activity: activity as tolerated  Diet: regular diet  Wound Care: none needed    Follow-up with     PCP in 2 weeks.     [facility] on 7/16/19 at 8:30 am and 7/22/19 at 8 am    Time worked: Less than 30 minutes    Participation:good    Electronically signed by ANG Briones on 7/12/2019 at 8:48 AM

## 2019-07-19 ASSESSMENT — ENCOUNTER SYMPTOMS
VOMITING: 0
ABDOMINAL PAIN: 0
SHORTNESS OF BREATH: 0

## 2021-09-29 ENCOUNTER — HOSPITAL ENCOUNTER (EMERGENCY)
Age: 46
Discharge: HOME | End: 2021-09-30
Payer: MEDICAID

## 2021-09-29 ENCOUNTER — APPOINTMENT (OUTPATIENT)
Dept: CT IMAGING | Age: 46
End: 2021-09-29
Payer: MEDICAID

## 2021-09-29 VITALS
TEMPERATURE: 98 F | BODY MASS INDEX: 15.95 KG/M2 | HEIGHT: 63 IN | RESPIRATION RATE: 33 BRPM | SYSTOLIC BLOOD PRESSURE: 114 MMHG | DIASTOLIC BLOOD PRESSURE: 66 MMHG | WEIGHT: 90 LBS | OXYGEN SATURATION: 99 % | HEART RATE: 74 BPM

## 2021-09-29 DIAGNOSIS — S02.40FA CLOSED FRACTURE OF LEFT ZYGOMATIC ARCH, INITIAL ENCOUNTER (HCC): ICD-10-CM

## 2021-09-29 DIAGNOSIS — S02.2XXA CLOSED FRACTURE OF NASAL BONE, INITIAL ENCOUNTER: ICD-10-CM

## 2021-09-29 DIAGNOSIS — S02.401A CLOSED FRACTURE OF MAXILLARY SINUS, INITIAL ENCOUNTER (HCC): Primary | ICD-10-CM

## 2021-09-29 DIAGNOSIS — S02.85XA FRACTURE OF LEFT ORBITAL WALL (HCC): ICD-10-CM

## 2021-09-29 DIAGNOSIS — S02.19XA: ICD-10-CM

## 2021-09-29 PROCEDURE — 80053 COMPREHEN METABOLIC PANEL: CPT

## 2021-09-29 PROCEDURE — 85025 COMPLETE CBC W/AUTO DIFF WBC: CPT

## 2021-09-29 PROCEDURE — 36415 COLL VENOUS BLD VENIPUNCTURE: CPT

## 2021-09-29 PROCEDURE — 70486 CT MAXILLOFACIAL W/O DYE: CPT

## 2021-09-29 PROCEDURE — 70450 CT HEAD/BRAIN W/O DYE: CPT

## 2021-09-29 PROCEDURE — 99284 EMERGENCY DEPT VISIT MOD MDM: CPT

## 2021-09-30 ENCOUNTER — APPOINTMENT (OUTPATIENT)
Dept: CT IMAGING | Age: 46
End: 2021-09-30
Payer: MEDICAID

## 2021-09-30 ENCOUNTER — APPOINTMENT (OUTPATIENT)
Dept: GENERAL RADIOLOGY | Age: 46
End: 2021-09-30
Payer: MEDICAID

## 2021-09-30 LAB
ALBUMIN SERPL-MCNC: 4.1 G/DL (ref 3.5–5.2)
ALP BLD-CCNC: 70 U/L (ref 40–130)
ALT SERPL-CCNC: 16 U/L (ref 5–41)
ANION GAP SERPL CALCULATED.3IONS-SCNC: 8 MMOL/L (ref 7–19)
AST SERPL-CCNC: 28 U/L (ref 5–40)
BASOPHILS ABSOLUTE: 0.1 K/UL (ref 0–0.2)
BASOPHILS RELATIVE PERCENT: 0.5 % (ref 0–1)
BILIRUB SERPL-MCNC: 0.3 MG/DL (ref 0.2–1.2)
BUN BLDV-MCNC: 16 MG/DL (ref 6–20)
CALCIUM SERPL-MCNC: 8.8 MG/DL (ref 8.6–10)
CHLORIDE BLD-SCNC: 105 MMOL/L (ref 98–111)
CO2: 27 MMOL/L (ref 22–29)
CREAT SERPL-MCNC: 0.8 MG/DL (ref 0.5–1.2)
EOSINOPHILS ABSOLUTE: 0.1 K/UL (ref 0–0.6)
EOSINOPHILS RELATIVE PERCENT: 0.4 % (ref 0–5)
GFR AFRICAN AMERICAN: >59
GFR NON-AFRICAN AMERICAN: >60
GLUCOSE BLD-MCNC: 103 MG/DL (ref 74–109)
HCT VFR BLD CALC: 39.4 % (ref 42–52)
HEMOGLOBIN: 12.3 G/DL (ref 14–18)
IMMATURE GRANULOCYTES #: 0.1 K/UL
LYMPHOCYTES ABSOLUTE: 1.9 K/UL (ref 1.1–4.5)
LYMPHOCYTES RELATIVE PERCENT: 11.1 % (ref 20–40)
MCH RBC QN AUTO: 25.7 PG (ref 27–31)
MCHC RBC AUTO-ENTMCNC: 31.2 G/DL (ref 33–37)
MCV RBC AUTO: 82.3 FL (ref 80–94)
MONOCYTES ABSOLUTE: 1.1 K/UL (ref 0–0.9)
MONOCYTES RELATIVE PERCENT: 6.3 % (ref 0–10)
NEUTROPHILS ABSOLUTE: 13.6 K/UL (ref 1.5–7.5)
NEUTROPHILS RELATIVE PERCENT: 81.3 % (ref 50–65)
PDW BLD-RTO: 13.8 % (ref 11.5–14.5)
PLATELET # BLD: 335 K/UL (ref 130–400)
PMV BLD AUTO: 10.5 FL (ref 9.4–12.4)
POTASSIUM REFLEX MAGNESIUM: 4.2 MMOL/L (ref 3.5–5)
RBC # BLD: 4.79 M/UL (ref 4.7–6.1)
SODIUM BLD-SCNC: 140 MMOL/L (ref 136–145)
TOTAL PROTEIN: 6.7 G/DL (ref 6.6–8.7)
WBC # BLD: 16.7 K/UL (ref 4.8–10.8)

## 2021-09-30 PROCEDURE — 71045 X-RAY EXAM CHEST 1 VIEW: CPT

## 2021-09-30 PROCEDURE — 6370000000 HC RX 637 (ALT 250 FOR IP): Performed by: PEDIATRICS

## 2021-09-30 PROCEDURE — 2580000003 HC RX 258: Performed by: NURSE PRACTITIONER

## 2021-09-30 PROCEDURE — 96375 TX/PRO/DX INJ NEW DRUG ADDON: CPT

## 2021-09-30 PROCEDURE — 72125 CT NECK SPINE W/O DYE: CPT

## 2021-09-30 PROCEDURE — 6360000002 HC RX W HCPCS: Performed by: NURSE PRACTITIONER

## 2021-09-30 PROCEDURE — 96374 THER/PROPH/DIAG INJ IV PUSH: CPT

## 2021-09-30 RX ORDER — HYDROCODONE BITARTRATE AND ACETAMINOPHEN 5; 325 MG/1; MG/1
1 TABLET ORAL EVERY 6 HOURS PRN
Qty: 12 TABLET | Refills: 0 | Status: SHIPPED | OUTPATIENT
Start: 2021-09-30 | End: 2021-10-03

## 2021-09-30 RX ORDER — AMOXICILLIN AND CLAVULANATE POTASSIUM 875; 125 MG/1; MG/1
1 TABLET, FILM COATED ORAL 2 TIMES DAILY
Qty: 20 TABLET | Refills: 0 | Status: SHIPPED | OUTPATIENT
Start: 2021-09-30 | End: 2021-10-10

## 2021-09-30 RX ORDER — 0.9 % SODIUM CHLORIDE 0.9 %
500 INTRAVENOUS SOLUTION INTRAVENOUS ONCE
Status: COMPLETED | OUTPATIENT
Start: 2021-09-30 | End: 2021-09-30

## 2021-09-30 RX ORDER — ONDANSETRON 2 MG/ML
4 INJECTION INTRAMUSCULAR; INTRAVENOUS ONCE
Status: COMPLETED | OUTPATIENT
Start: 2021-09-30 | End: 2021-09-30

## 2021-09-30 RX ORDER — ONDANSETRON 2 MG/ML
4 INJECTION INTRAMUSCULAR; INTRAVENOUS ONCE
Status: DISCONTINUED | OUTPATIENT
Start: 2021-09-30 | End: 2021-09-30 | Stop reason: HOSPADM

## 2021-09-30 RX ORDER — HYDROMORPHONE HYDROCHLORIDE 1 MG/ML
0.5 INJECTION, SOLUTION INTRAMUSCULAR; INTRAVENOUS; SUBCUTANEOUS ONCE
Status: COMPLETED | OUTPATIENT
Start: 2021-09-30 | End: 2021-09-30

## 2021-09-30 RX ADMIN — HYDROMORPHONE HYDROCHLORIDE 0.5 MG: 1 INJECTION, SOLUTION INTRAMUSCULAR; INTRAVENOUS; SUBCUTANEOUS at 01:05

## 2021-09-30 RX ADMIN — ONDANSETRON 4 MG: 2 INJECTION INTRAMUSCULAR; INTRAVENOUS at 01:04

## 2021-09-30 RX ADMIN — SODIUM CHLORIDE 500 ML: 9 INJECTION, SOLUTION INTRAVENOUS at 01:03

## 2021-09-30 RX ADMIN — IBUPROFEN 600 MG: 200 TABLET, FILM COATED ORAL at 06:46

## 2021-09-30 ASSESSMENT — VISUAL ACUITY: OU: 1

## 2021-09-30 ASSESSMENT — PAIN SCALES - GENERAL
PAINLEVEL_OUTOF10: 6
PAINLEVEL_OUTOF10: 8

## 2021-09-30 NOTE — ED NOTES
Continue awaiting on Clinical House to get in touch with [facility] on possible voucher for cab ride home. Pt unable to get ride home.      [place]  09/30/21 [id number]

## 2021-09-30 NOTE — ED NOTES
Asked pt if there would be anyone in the morning time that would be able to come get him and pt denies again, states \"I don't have any friends or family. Ain't nobody gonna come get me. \"     Mukesh Vela RN  09/30/21 [id number]

## 2021-09-30 NOTE — PROGRESS NOTES
Approval for cab voucher approved by Abdelrahman Brito.   Electronically signed by Lisy Vasquez RN on 9/30/2021 at 8:10 AM
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
## 2021-09-30 NOTE — ED NOTES
Speech called again @ [phone] unable to contact     [address] [employer]  09/30/21 8084

## 2021-09-30 NOTE — ED NOTES
Report to Ana Cristina Luke.       Cynthia Hooper, [facility]  09/30/21 3761
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
